# Patient Record
Sex: MALE | Race: BLACK OR AFRICAN AMERICAN | NOT HISPANIC OR LATINO | URBAN - METROPOLITAN AREA
[De-identification: names, ages, dates, MRNs, and addresses within clinical notes are randomized per-mention and may not be internally consistent; named-entity substitution may affect disease eponyms.]

---

## 2017-01-25 ENCOUNTER — EMERGENCY (EMERGENCY)
Facility: HOSPITAL | Age: 57
LOS: 1 days | Discharge: HOME | End: 2017-01-25
Attending: EMERGENCY MEDICINE | Admitting: EMERGENCY MEDICINE
Payer: COMMERCIAL

## 2017-01-25 VITALS
TEMPERATURE: 98 F | RESPIRATION RATE: 19 BRPM | SYSTOLIC BLOOD PRESSURE: 143 MMHG | HEART RATE: 52 BPM | DIASTOLIC BLOOD PRESSURE: 67 MMHG | OXYGEN SATURATION: 100 %

## 2017-01-25 DIAGNOSIS — R07.89 OTHER CHEST PAIN: ICD-10-CM

## 2017-01-25 DIAGNOSIS — I10 ESSENTIAL (PRIMARY) HYPERTENSION: ICD-10-CM

## 2017-01-25 DIAGNOSIS — F17.200 NICOTINE DEPENDENCE, UNSPECIFIED, UNCOMPLICATED: ICD-10-CM

## 2017-01-25 LAB
ALBUMIN SERPL ELPH-MCNC: 4.1 G/DL — SIGNIFICANT CHANGE UP (ref 3.3–5)
ALP SERPL-CCNC: 51 U/L — SIGNIFICANT CHANGE UP (ref 40–120)
ALT FLD-CCNC: 19 U/L RC — SIGNIFICANT CHANGE UP (ref 10–45)
ANION GAP SERPL CALC-SCNC: 12 MMOL/L — SIGNIFICANT CHANGE UP (ref 5–17)
APTT BLD: 25.9 SEC — LOW (ref 27.5–37.4)
AST SERPL-CCNC: 18 U/L — SIGNIFICANT CHANGE UP (ref 10–40)
BASOPHILS # BLD AUTO: 0 K/UL — SIGNIFICANT CHANGE UP (ref 0–0.2)
BASOPHILS NFR BLD AUTO: 0.7 % — SIGNIFICANT CHANGE UP (ref 0–2)
BILIRUB SERPL-MCNC: 0.2 MG/DL — SIGNIFICANT CHANGE UP (ref 0.2–1.2)
BUN SERPL-MCNC: 15 MG/DL — SIGNIFICANT CHANGE UP (ref 7–23)
CALCIUM SERPL-MCNC: 9.4 MG/DL — SIGNIFICANT CHANGE UP (ref 8.4–10.5)
CHLORIDE SERPL-SCNC: 103 MMOL/L — SIGNIFICANT CHANGE UP (ref 96–108)
CK MB BLD-MCNC: 1.5 % — SIGNIFICANT CHANGE UP (ref 0–3.5)
CK MB CFR SERPL CALC: 3.4 NG/ML — SIGNIFICANT CHANGE UP (ref 0–6.7)
CK SERPL-CCNC: 223 U/L — HIGH (ref 30–200)
CO2 SERPL-SCNC: 25 MMOL/L — SIGNIFICANT CHANGE UP (ref 22–31)
CREAT SERPL-MCNC: 1.38 MG/DL — HIGH (ref 0.5–1.3)
EOSINOPHIL # BLD AUTO: 0.2 K/UL — SIGNIFICANT CHANGE UP (ref 0–0.5)
EOSINOPHIL NFR BLD AUTO: 2.8 % — SIGNIFICANT CHANGE UP (ref 0–6)
GLUCOSE SERPL-MCNC: 94 MG/DL — SIGNIFICANT CHANGE UP (ref 70–99)
HCT VFR BLD CALC: 39.4 % — SIGNIFICANT CHANGE UP (ref 39–50)
HGB BLD-MCNC: 13.4 G/DL — SIGNIFICANT CHANGE UP (ref 13–17)
INR BLD: 1.04 RATIO — SIGNIFICANT CHANGE UP (ref 0.88–1.16)
LYMPHOCYTES # BLD AUTO: 2.3 K/UL — SIGNIFICANT CHANGE UP (ref 1–3.3)
LYMPHOCYTES # BLD AUTO: 42.1 % — SIGNIFICANT CHANGE UP (ref 13–44)
MCHC RBC-ENTMCNC: 29.1 PG — SIGNIFICANT CHANGE UP (ref 27–34)
MCHC RBC-ENTMCNC: 34.1 GM/DL — SIGNIFICANT CHANGE UP (ref 32–36)
MCV RBC AUTO: 85.3 FL — SIGNIFICANT CHANGE UP (ref 80–100)
MONOCYTES # BLD AUTO: 0.4 K/UL — SIGNIFICANT CHANGE UP (ref 0–0.9)
MONOCYTES NFR BLD AUTO: 7.4 % — SIGNIFICANT CHANGE UP (ref 2–14)
NEUTROPHILS # BLD AUTO: 2.6 K/UL — SIGNIFICANT CHANGE UP (ref 1.8–7.4)
NEUTROPHILS NFR BLD AUTO: 47 % — SIGNIFICANT CHANGE UP (ref 43–77)
PLATELET # BLD AUTO: 157 K/UL — SIGNIFICANT CHANGE UP (ref 150–400)
POTASSIUM SERPL-MCNC: 4.1 MMOL/L — SIGNIFICANT CHANGE UP (ref 3.5–5.3)
POTASSIUM SERPL-SCNC: 4.1 MMOL/L — SIGNIFICANT CHANGE UP (ref 3.5–5.3)
PROT SERPL-MCNC: 7.3 G/DL — SIGNIFICANT CHANGE UP (ref 6–8.3)
PROTHROM AB SERPL-ACNC: 11.2 SEC — SIGNIFICANT CHANGE UP (ref 10–13.1)
RBC # BLD: 4.62 M/UL — SIGNIFICANT CHANGE UP (ref 4.2–5.8)
RBC # FLD: 13.6 % — SIGNIFICANT CHANGE UP (ref 10.3–14.5)
SODIUM SERPL-SCNC: 140 MMOL/L — SIGNIFICANT CHANGE UP (ref 135–145)
TROPONIN T SERPL-MCNC: <0.01 NG/ML — SIGNIFICANT CHANGE UP (ref 0–0.06)
TROPONIN T SERPL-MCNC: <0.01 NG/ML — SIGNIFICANT CHANGE UP (ref 0–0.06)
WBC # BLD: 5.6 K/UL — SIGNIFICANT CHANGE UP (ref 3.8–10.5)
WBC # FLD AUTO: 5.6 K/UL — SIGNIFICANT CHANGE UP (ref 3.8–10.5)

## 2017-01-25 PROCEDURE — 99220: CPT

## 2017-01-25 PROCEDURE — 71010: CPT | Mod: 26

## 2017-01-25 RX ORDER — ASPIRIN/CALCIUM CARB/MAGNESIUM 324 MG
81 TABLET ORAL DAILY
Qty: 0 | Refills: 0 | Status: DISCONTINUED | OUTPATIENT
Start: 2017-01-25 | End: 2017-01-29

## 2017-01-25 RX ORDER — ASPIRIN/CALCIUM CARB/MAGNESIUM 324 MG
324 TABLET ORAL ONCE
Qty: 0 | Refills: 0 | Status: COMPLETED | OUTPATIENT
Start: 2017-01-25 | End: 2017-01-25

## 2017-01-25 RX ORDER — KETOROLAC TROMETHAMINE 30 MG/ML
30 SYRINGE (ML) INJECTION EVERY 6 HOURS
Qty: 0 | Refills: 0 | Status: DISCONTINUED | OUTPATIENT
Start: 2017-01-25 | End: 2017-01-25

## 2017-01-25 RX ADMIN — Medication 30 MILLIGRAM(S): at 22:04

## 2017-01-25 RX ADMIN — Medication 30 MILLIGRAM(S): at 21:09

## 2017-01-25 RX ADMIN — Medication 324 MILLIGRAM(S): at 14:42

## 2017-01-25 NOTE — ED ADULT NURSE NOTE - OBJECTIVE STATEMENT
57 y/o M, reported to ED from PMD. A&Ox3, c/o shooting pain in his left pectoral area. Pt reports that the pain is moving from the left side of his chest to the right side. Pt states that the pain started last night at 23:00. Pt reports that the pain is intermittent and lasts for a few seconds at a time. Pt reports that he use to have left pectoral muscle pain in the past but he believes that this pain is different. Pt denies LOC, SOB, N/V/D, abd pain. Pt denies fever or chills and is afebrile upon arrival to ED. Wife at bedside. Will continue to monitor pt.

## 2017-01-25 NOTE — ED CDU PROVIDER NOTE - PLAN OF CARE
1. Follow up with your PMD and/or cardiologist within 48-72 hours.   2. Show copies of your reports given to you. Recommend Aspirin 81mg over the counter daily until further evaluation.  Take all of your other medications as previously prescribed.   3. Worsening or continued chest pain, shortness of breath, weakness, return to ED. 1. Stay hydrated.  2. Continue Current Home Medications  3. Follow up with your PCP Dr. Branch in 1-2 days (Bring printed results to your doctor visit).  4. Return if symptoms, worsen, fever, weakness, chest pain, difficulty breathing, dizziness and all other concerns. 1. Stay hydrated. Stop smoking.   2. Continue Current Home Medications  3. Follow up with your PCP Dr. Branch in 1-2 days and follow up elevated creatinine level. (Bring printed results to your doctor visit).  4. Return if symptoms, worsen, fever, weakness, chest pain, difficulty breathing, dizziness and all other concerns. 1. Stay hydrated. Stop smoking.   2. Continue Current Home Medications  3. Follow up with your PCP Dr. Branch in 1-2 days and follow up elevated creatinine level and hgb A1C (level puts you in pre-diabetic category). (Bring printed results to your doctor visit).  4. Return if symptoms, worsen, fever, weakness, chest pain, difficulty breathing, dizziness and all other concerns. 1. Stay hydrated. Stop smoking.   2. Continue Current Home Medications. Take Aspirin 81mg daily.  3. Follow up with your PCP Dr. Bracnh in 1-2 days and follow up elevated creatinine level and hgb A1C level. (Bring printed results to your doctor visit).  4. Return if symptoms, worsen, or  if you develop any fever, weakness, chest pain, difficulty breathing, dizziness or any other concerns.

## 2017-01-25 NOTE — ED CDU PROVIDER NOTE - ATTENDING CONTRIBUTION TO CARE
Attending MD Rizo:   I personally have seen and examined this patient.  Physician assistant note reviewed and agree on plan of care and except where noted.  See HPI, PE, and MDM for details.

## 2017-01-25 NOTE — ED PROVIDER NOTE - MEDICAL DECISION MAKING DETAILS
Attending MD Rizo: 56M with HTN and daily smoker presenting with intermittent chest pain, atypical in nature for ACS however given significant smoking history and HTN will obtain Maico x 2 to r/o ACS and obtain nuclear stress. Do not suspect PE or dissection

## 2017-01-25 NOTE — ED PROVIDER NOTE - ATTENDING CONTRIBUTION TO CARE
Attending MD Rizo:  I personally have seen and examined this patient.  Resident note reviewed and agree on plan of care and except where noted.  See HPI, PE, and MDM for details.

## 2017-01-25 NOTE — ED CDU PROVIDER NOTE - DETAILS
CHEST PAIN  -[facility]  -[facility]  -[facility] EVAL  -Stress test   -CASE D/W ATTENDING Sallie

## 2017-01-25 NOTE — ED ADULT NURSE NOTE - ED STAT RN HANDOFF DETAILS
Report given to SULTANA Brunner   RN was on break at the time. Myesha WEINBERG said Nicole CONTRERAS should send the pt over.

## 2017-01-25 NOTE — ED PROVIDER NOTE - PHYSICAL EXAMINATION
Attending MD Rizo: A & O x 3, NAD, HEENT WNL and no facial asymmetry; lungs CTAB, heart with reg rhythm without murmur; abdomen soft NTND; extremities with no edema; neuro exam non focal with no motor or sensory deficits. peripheral pulses full and equal in bilateral upper and lower extremities

## 2017-01-25 NOTE — ED CDU PROVIDER NOTE - OBJECTIVE STATEMENT
56M with HTN, daily smoker presenting with left anterior chest pain x 1 day, intermittent, mild sharp, nonradiating. No clear modifying factors. Denies dyspnea or diaphoresis. No exertional component. Has not taken anything for pain yet

## 2017-01-25 NOTE — ED PROVIDER NOTE - OBJECTIVE STATEMENT
56M with HTN, daily smoker presenting with left anterior chest pain x 1 day, intermittent, mild sharp, nonradiating. No clear modifying factors. Denies dyspnea or diaphoresis. No exertional component 56M with HTN, daily smoker presenting with left anterior chest pain x 1 day, intermittent, mild sharp, nonradiating. No clear modifying factors. Denies dyspnea or diaphoresis. No exertional component. Has not taken anything for pain yet

## 2017-01-25 NOTE — ED CDU PROVIDER NOTE - PROGRESS NOTE DETAILS
CDU NOTE SULTANA Calvert: pt resting comfortably, feels well except has intermittent chest pain. NAD VSS. no events on tele. chest wall ttp at L anterior chest. anti-inflammatory ordered. CDU NOTE SULTANA Calvert: pt asleep. NAD VSS. no events on tele. CDU NOTE SULTANA Calvert: pt resting comfortably, feels well without complaint. pt reports Toradol helped improve symptoms- less frequent episodes and less intense but did feel 3 episodes overnight. no current cp. NAD VSS. no events on tele. Pt comfortable. Pt states pain is intermittent. Vital signs stable. Will continue to observe and reassess. Awaiting stress test. -Mallory Scanlon PA-C Pt currently in stress lab. -Mallory Scanlon PA-C Pt comfortable. No complaints. Vital signs stable. Stress test shows some fixed defects and artifact. Will d/c pt home on baby aspirin and outpt cardiology follow up. D/W Dr. Sidhu. -Mallory Scanlon PA-C ED attending Dr Cas Sidhu note:  Patient re-evaluated and doing well.  No acute issues at  this time.  Lab and radiology tests reviewed with patient and/or family.  Patient stable for discharge.  I have personally performed a face to face diagnostic evaluation on this patient.  I have reviewed the ACP note and agree with the history, exam, and plan of care, except as noted.  History and Exam by me showed normal exam with no changes. Pt comfortable. No complaints. Vital signs stable. Stress test shows some fixed defects and artifact. Will d/c pt home with follow up. ED attending Dr Cas Robins note:  Patient re-evaluated and doing well.  No acute issues at  this time.  Lab and radiology tests reviewed with patient and/or family.  Patient stable for discharge.  I have personally performed a face to face diagnostic evaluation on this patient.  I have reviewed the ACP note and agree with the history, exam, and plan of care, except as noted.  History and Exam by me showed normal exam with no changes. Pt comfortable. No complaints. Vital signs stable. Stress test shows some fixed defects and artifact. Will d/c pt home with follow up.  cas robins md

## 2017-01-26 VITALS
DIASTOLIC BLOOD PRESSURE: 92 MMHG | HEART RATE: 56 BPM | SYSTOLIC BLOOD PRESSURE: 143 MMHG | RESPIRATION RATE: 18 BRPM | OXYGEN SATURATION: 98 % | TEMPERATURE: 98 F

## 2017-01-26 PROCEDURE — 93018 CV STRESS TEST I&R ONLY: CPT

## 2017-01-26 PROCEDURE — 78452 HT MUSCLE IMAGE SPECT MULT: CPT | Mod: 26

## 2017-01-26 PROCEDURE — 71045 X-RAY EXAM CHEST 1 VIEW: CPT

## 2017-01-26 PROCEDURE — 99284 EMERGENCY DEPT VISIT MOD MDM: CPT | Mod: 25

## 2017-01-26 PROCEDURE — G0378: CPT

## 2017-01-26 PROCEDURE — 93017 CV STRESS TEST TRACING ONLY: CPT

## 2017-01-26 PROCEDURE — 96374 THER/PROPH/DIAG INJ IV PUSH: CPT

## 2017-01-26 PROCEDURE — 93016 CV STRESS TEST SUPVJ ONLY: CPT

## 2017-01-26 PROCEDURE — 82553 CREATINE MB FRACTION: CPT

## 2017-01-26 PROCEDURE — 93005 ELECTROCARDIOGRAM TRACING: CPT

## 2017-01-26 PROCEDURE — 80048 BASIC METABOLIC PNL TOTAL CA: CPT

## 2017-01-26 PROCEDURE — 80053 COMPREHEN METABOLIC PANEL: CPT

## 2017-01-26 PROCEDURE — 83036 HEMOGLOBIN GLYCOSYLATED A1C: CPT

## 2017-01-26 PROCEDURE — 80061 LIPID PANEL: CPT

## 2017-01-26 PROCEDURE — 84484 ASSAY OF TROPONIN QUANT: CPT

## 2017-01-26 PROCEDURE — 85027 COMPLETE CBC AUTOMATED: CPT

## 2017-01-26 PROCEDURE — 82550 ASSAY OF CK (CPK): CPT

## 2017-01-26 PROCEDURE — 85730 THROMBOPLASTIN TIME PARTIAL: CPT

## 2017-01-26 PROCEDURE — A9500: CPT

## 2017-01-26 PROCEDURE — 78452 HT MUSCLE IMAGE SPECT MULT: CPT

## 2017-01-26 PROCEDURE — 85610 PROTHROMBIN TIME: CPT

## 2017-01-26 RX ORDER — SODIUM CHLORIDE 9 MG/ML
1000 INJECTION INTRAMUSCULAR; INTRAVENOUS; SUBCUTANEOUS ONCE
Qty: 0 | Refills: 0 | Status: COMPLETED | OUTPATIENT
Start: 2017-01-26 | End: 2017-01-26

## 2017-01-26 RX ADMIN — Medication 81 MILLIGRAM(S): at 13:32

## 2017-01-26 RX ADMIN — SODIUM CHLORIDE 1000 MILLILITER(S): 9 INJECTION INTRAMUSCULAR; INTRAVENOUS; SUBCUTANEOUS at 06:38

## 2017-01-26 NOTE — ED ADULT NURSE REASSESSMENT NOTE - NS ED NURSE REASSESS COMMENT FT1
Pt. discharged to home as per Dr. Huerta's order. Pt. verbalized understanding to discharge instructions given by SULTANA Tejada. Pt. to follow up with PCP post discharge. Pt. to return to ER for worsening symptoms such as fever, CP and SOB. Questions answered and verbalized good understanding. Discharge criteria met. Discharged home as per protocol. Telemetry discontinued and IVL removed. No redness or bleeding from IV site. Pt. left the unit in stable condition.
pt arrived and oriented to CDU.  Pt A&OX3.  VSS.  pt states that he has been having intermediate sharp left sided CP for the past few weeks.  pt denies any SOB/dizziness/palps.  He also states that his left arm has a "different sensation" but denies numbness and tingling.  pt reports that towards the end of December he was dx with hypertension and was having blurred vision and has since been on a medication regime.  pt currently denies CP.  pt sinus power in 50's on cardiac monitor.  pt pending stress test in am.  CE #2 due at 2000.  plan of care explained to pt and family.  Safety maintained.  meal provided. Will cont to monitor.
report taken from Elis CONTRERAS pt stable no complaints.
Received pt. from ROSI Storey. Sinus power on tele monitor. Pt. still with c/o chest discomfort that he came in to ER with. Refuses any medications at this time. Awaiting nuclear stress. IV access patent. Educated about fall precautions. Safety maintained. Needs attended to. Will continue to monitor.

## 2017-01-30 PROCEDURE — 99217: CPT

## 2017-04-04 PROBLEM — Z00.00 ENCOUNTER FOR PREVENTIVE HEALTH EXAMINATION: Status: ACTIVE | Noted: 2017-04-04

## 2017-04-06 ENCOUNTER — LABORATORY RESULT (OUTPATIENT)
Age: 57
End: 2017-04-06

## 2017-04-06 ENCOUNTER — APPOINTMENT (OUTPATIENT)
Dept: RHEUMATOLOGY | Facility: CLINIC | Age: 57
End: 2017-04-06

## 2017-04-06 VITALS
DIASTOLIC BLOOD PRESSURE: 78 MMHG | RESPIRATION RATE: 16 BRPM | TEMPERATURE: 98 F | HEIGHT: 72 IN | WEIGHT: 231 LBS | BODY MASS INDEX: 31.29 KG/M2 | SYSTOLIC BLOOD PRESSURE: 140 MMHG | OXYGEN SATURATION: 98 % | HEART RATE: 48 BPM

## 2017-04-06 DIAGNOSIS — F17.200 NICOTINE DEPENDENCE, UNSPECIFIED, UNCOMPLICATED: ICD-10-CM

## 2017-04-06 LAB
ALBUMIN SERPL ELPH-MCNC: 4.1 G/DL
ALP BLD-CCNC: 51 U/L
ALT SERPL-CCNC: 16 U/L
ANION GAP SERPL CALC-SCNC: 15 MMOL/L
APPEARANCE: CLEAR
AST SERPL-CCNC: 16 U/L
BASOPHILS # BLD AUTO: 0.05 K/UL
BASOPHILS NFR BLD AUTO: 0.7 %
BILIRUB SERPL-MCNC: 0.2 MG/DL
BILIRUBIN URINE: NEGATIVE
BLOOD URINE: ABNORMAL
BUN SERPL-MCNC: 11 MG/DL
CALCIUM SERPL-MCNC: 9.5 MG/DL
CHLORIDE SERPL-SCNC: 104 MMOL/L
CO2 SERPL-SCNC: 26 MMOL/L
COLOR: YELLOW
CREAT SERPL-MCNC: 1.47 MG/DL
CRP SERPL-MCNC: 0.4 MG/DL
EOSINOPHIL # BLD AUTO: 0.22 K/UL
EOSINOPHIL NFR BLD AUTO: 3.1 %
GLUCOSE QUALITATIVE U: NORMAL MG/DL
GLUCOSE SERPL-MCNC: 96 MG/DL
HCT VFR BLD CALC: 43.8 %
HGB BLD-MCNC: 14.1 G/DL
IMM GRANULOCYTES NFR BLD AUTO: 0 %
KETONES URINE: NEGATIVE
LEUKOCYTE ESTERASE URINE: NEGATIVE
LYMPHOCYTES # BLD AUTO: 2.71 K/UL
LYMPHOCYTES NFR BLD AUTO: 38.2 %
MAN DIFF?: NORMAL
MCHC RBC-ENTMCNC: 27.6 PG
MCHC RBC-ENTMCNC: 32.2 GM/DL
MCV RBC AUTO: 85.7 FL
MONOCYTES # BLD AUTO: 0.45 K/UL
MONOCYTES NFR BLD AUTO: 6.3 %
NEUTROPHILS # BLD AUTO: 3.67 K/UL
NEUTROPHILS NFR BLD AUTO: 51.7 %
NITRITE URINE: NEGATIVE
PH URINE: 5.5
PLATELET # BLD AUTO: 186 K/UL
POTASSIUM SERPL-SCNC: 4.6 MMOL/L
PROT SERPL-MCNC: 6.9 G/DL
PROTEIN URINE: NEGATIVE MG/DL
RBC # BLD: 5.11 M/UL
RBC # FLD: 15.9 %
RHEUMATOID FACT SER QL: 8.2 IU/ML
SODIUM SERPL-SCNC: 145 MMOL/L
SPECIFIC GRAVITY URINE: 1.02
URATE SERPL-MCNC: 6 MG/DL
UROBILINOGEN URINE: NORMAL MG/DL
WBC # FLD AUTO: 7.1 K/UL

## 2017-04-06 RX ORDER — ERGOCALCIFEROL 1.25 MG/1
1.25 MG CAPSULE, LIQUID FILLED ORAL
Qty: 4 | Refills: 0 | Status: ACTIVE | COMMUNITY
Start: 2016-11-18

## 2017-04-07 LAB
CCP AB SER IA-ACNC: <8 UNITS
ERYTHROCYTE [SEDIMENTATION RATE] IN BLOOD BY WESTERGREN METHOD: 17 MM/HR
RF+CCP IGG SER-IMP: NEGATIVE

## 2017-05-15 ENCOUNTER — APPOINTMENT (OUTPATIENT)
Dept: RHEUMATOLOGY | Facility: CLINIC | Age: 57
End: 2017-05-15

## 2017-05-15 VITALS
HEART RATE: 52 BPM | DIASTOLIC BLOOD PRESSURE: 73 MMHG | OXYGEN SATURATION: 98 % | TEMPERATURE: 98 F | SYSTOLIC BLOOD PRESSURE: 134 MMHG | RESPIRATION RATE: 16 BRPM

## 2017-05-15 DIAGNOSIS — I49.9 CARDIAC ARRHYTHMIA, UNSPECIFIED: ICD-10-CM

## 2017-05-17 PROBLEM — I49.9 IRREGULAR HEART RHYTHM: Status: ACTIVE | Noted: 2017-04-12

## 2017-07-25 ENCOUNTER — APPOINTMENT (OUTPATIENT)
Dept: RHEUMATOLOGY | Facility: CLINIC | Age: 57
End: 2017-07-25

## 2017-07-25 VITALS
TEMPERATURE: 97.7 F | SYSTOLIC BLOOD PRESSURE: 169 MMHG | HEART RATE: 61 BPM | BODY MASS INDEX: 31.15 KG/M2 | HEIGHT: 72 IN | OXYGEN SATURATION: 97 % | WEIGHT: 230 LBS | DIASTOLIC BLOOD PRESSURE: 81 MMHG

## 2017-07-25 RX ORDER — AMLODIPINE BESYLATE AND BENAZEPRIL HYDROCHLORIDE 5; 20 MG/1; MG/1
5-20 CAPSULE ORAL
Qty: 30 | Refills: 0 | Status: DISCONTINUED | COMMUNITY
Start: 2017-02-01 | End: 2017-07-25

## 2017-07-25 RX ORDER — METOPROLOL SUCCINATE 50 MG/1
50 TABLET, EXTENDED RELEASE ORAL
Qty: 30 | Refills: 0 | Status: ACTIVE | COMMUNITY
Start: 2017-07-12

## 2018-01-23 ENCOUNTER — FORM ENCOUNTER (OUTPATIENT)
Age: 58
End: 2018-01-23

## 2018-01-24 ENCOUNTER — APPOINTMENT (OUTPATIENT)
Dept: RHEUMATOLOGY | Facility: CLINIC | Age: 58
End: 2018-01-24
Payer: COMMERCIAL

## 2018-01-24 PROCEDURE — 73030 X-RAY EXAM OF SHOULDER: CPT | Mod: RT

## 2018-02-22 ENCOUNTER — APPOINTMENT (OUTPATIENT)
Dept: RHEUMATOLOGY | Facility: CLINIC | Age: 58
End: 2018-02-22
Payer: COMMERCIAL

## 2018-02-22 VITALS
OXYGEN SATURATION: 98 % | BODY MASS INDEX: 32.23 KG/M2 | WEIGHT: 238 LBS | DIASTOLIC BLOOD PRESSURE: 87 MMHG | HEIGHT: 72 IN | TEMPERATURE: 97.5 F | SYSTOLIC BLOOD PRESSURE: 140 MMHG

## 2018-02-22 PROCEDURE — 99213 OFFICE O/P EST LOW 20 MIN: CPT

## 2018-03-22 ENCOUNTER — RX RENEWAL (OUTPATIENT)
Age: 58
End: 2018-03-22

## 2018-03-22 ENCOUNTER — MOBILE ON CALL (OUTPATIENT)
Age: 58
End: 2018-03-22

## 2018-03-22 RX ORDER — PREDNISONE 20 MG/1
20 TABLET ORAL
Qty: 5 | Refills: 0 | Status: DISCONTINUED | COMMUNITY
Start: 2018-03-22 | End: 2018-03-22

## 2018-03-26 ENCOUNTER — RX RENEWAL (OUTPATIENT)
Age: 58
End: 2018-03-26

## 2018-03-27 RX ORDER — DICLOFENAC SODIUM 10 MG/G
1 GEL TOPICAL
Qty: 1 | Refills: 3 | Status: ACTIVE | COMMUNITY
Start: 2017-04-06

## 2018-04-05 ENCOUNTER — APPOINTMENT (OUTPATIENT)
Dept: RHEUMATOLOGY | Facility: CLINIC | Age: 58
End: 2018-04-05
Payer: COMMERCIAL

## 2018-04-05 VITALS
HEART RATE: 50 BPM | SYSTOLIC BLOOD PRESSURE: 124 MMHG | HEIGHT: 72 IN | WEIGHT: 233 LBS | OXYGEN SATURATION: 98 % | DIASTOLIC BLOOD PRESSURE: 75 MMHG | TEMPERATURE: 97.6 F | BODY MASS INDEX: 31.56 KG/M2

## 2018-04-05 PROCEDURE — 20610 DRAIN/INJ JOINT/BURSA W/O US: CPT | Mod: RT

## 2018-04-05 RX ORDER — DILTIAZEM HYDROCHLORIDE 180 MG/1
180 CAPSULE, EXTENDED RELEASE ORAL
Qty: 30 | Refills: 0 | Status: ACTIVE | COMMUNITY
Start: 2017-08-04

## 2018-04-05 RX ORDER — PANTOPRAZOLE 40 MG/1
40 TABLET, DELAYED RELEASE ORAL
Qty: 30 | Refills: 0 | Status: ACTIVE | COMMUNITY
Start: 2018-01-10

## 2018-04-06 ENCOUNTER — MED ADMIN CHARGE (OUTPATIENT)
Age: 58
End: 2018-04-06

## 2018-04-06 RX ORDER — LIDOCAINE HYDROCHLORIDE 10 MG/ML
1 INJECTION, SOLUTION INFILTRATION; PERINEURAL
Refills: 0 | Status: COMPLETED | OUTPATIENT
Start: 2018-04-06

## 2018-04-06 RX ORDER — METHYLPRED ACET/NACL,ISO-OS/PF 40 MG/ML
40 VIAL (ML) INJECTION
Qty: 1 | Refills: 0 | Status: COMPLETED | OUTPATIENT
Start: 2018-04-06

## 2018-04-06 RX ADMIN — LIDOCAINE HYDROCHLORIDE %: 10 INJECTION, SOLUTION INFILTRATION; PERINEURAL at 00:00

## 2018-04-06 RX ADMIN — METHYLPREDNISOLONE ACETATE MG/ML: 40 INJECTION, SUSPENSION INTRA-ARTICULAR; INTRALESIONAL; INTRAMUSCULAR; SOFT TISSUE at 00:00

## 2018-04-16 ENCOUNTER — CHART COPY (OUTPATIENT)
Age: 58
End: 2018-04-16

## 2018-07-20 ENCOUNTER — APPOINTMENT (OUTPATIENT)
Dept: RHEUMATOLOGY | Facility: CLINIC | Age: 58
End: 2018-07-20
Payer: COMMERCIAL

## 2018-07-20 VITALS
BODY MASS INDEX: 30.34 KG/M2 | HEIGHT: 72 IN | DIASTOLIC BLOOD PRESSURE: 86 MMHG | WEIGHT: 224 LBS | SYSTOLIC BLOOD PRESSURE: 140 MMHG | TEMPERATURE: 98 F | HEART RATE: 60 BPM | OXYGEN SATURATION: 98 %

## 2018-07-20 LAB
BASOPHILS # BLD AUTO: 0.04 K/UL
BASOPHILS NFR BLD AUTO: 0.6 %
EOSINOPHIL # BLD AUTO: 0.08 K/UL
EOSINOPHIL NFR BLD AUTO: 1.1 %
ERYTHROCYTE [SEDIMENTATION RATE] IN BLOOD BY WESTERGREN METHOD: 18 MM/HR
HCT VFR BLD CALC: 45.2 %
HGB BLD-MCNC: 14.4 G/DL
IMM GRANULOCYTES NFR BLD AUTO: 0.3 %
LYMPHOCYTES # BLD AUTO: 2.19 K/UL
LYMPHOCYTES NFR BLD AUTO: 30.1 %
MAN DIFF?: NORMAL
MCHC RBC-ENTMCNC: 27.7 PG
MCHC RBC-ENTMCNC: 31.9 GM/DL
MCV RBC AUTO: 86.9 FL
MONOCYTES # BLD AUTO: 0.47 K/UL
MONOCYTES NFR BLD AUTO: 6.5 %
NEUTROPHILS # BLD AUTO: 4.47 K/UL
NEUTROPHILS NFR BLD AUTO: 61.4 %
PLATELET # BLD AUTO: 200 K/UL
RBC # BLD: 5.2 M/UL
RBC # FLD: 15.8 %
WBC # FLD AUTO: 7.27 K/UL

## 2018-07-20 PROCEDURE — 99213 OFFICE O/P EST LOW 20 MIN: CPT

## 2018-07-20 RX ORDER — CYCLOBENZAPRINE HYDROCHLORIDE 10 MG/1
10 TABLET, FILM COATED ORAL
Qty: 40 | Refills: 0 | Status: DISCONTINUED | COMMUNITY
Start: 2018-01-10 | End: 2018-07-20

## 2018-07-24 LAB
25(OH)D3 SERPL-MCNC: 21.5 NG/ML
ALBUMIN SERPL ELPH-MCNC: 4.5 G/DL
ALP BLD-CCNC: 57 U/L
ALT SERPL-CCNC: 18 U/L
ANION GAP SERPL CALC-SCNC: 12 MMOL/L
APPEARANCE: CLEAR
AST SERPL-CCNC: 18 U/L
BILIRUB SERPL-MCNC: 0.3 MG/DL
BILIRUBIN URINE: NEGATIVE
BLOOD URINE: NEGATIVE
BUN SERPL-MCNC: 10 MG/DL
CALCIUM SERPL-MCNC: 9.7 MG/DL
CCP AB SER IA-ACNC: <8 UNITS
CHLORIDE SERPL-SCNC: 103 MMOL/L
CO2 SERPL-SCNC: 30 MMOL/L
COLOR: YELLOW
CREAT SERPL-MCNC: 1.43 MG/DL
CRP SERPL-MCNC: 0.52 MG/DL
GLUCOSE QUALITATIVE U: NEGATIVE MG/DL
GLUCOSE SERPL-MCNC: 111 MG/DL
KETONES URINE: NEGATIVE
LEUKOCYTE ESTERASE URINE: NEGATIVE
NITRITE URINE: NEGATIVE
PH URINE: 6.5
POTASSIUM SERPL-SCNC: 4.7 MMOL/L
PROT SERPL-MCNC: 7.5 G/DL
PROTEIN URINE: NEGATIVE MG/DL
RF+CCP IGG SER-IMP: NEGATIVE
RHEUMATOID FACT SER QL: <10 IU/ML
SODIUM SERPL-SCNC: 144 MMOL/L
SPECIFIC GRAVITY URINE: 1.01
UROBILINOGEN URINE: NEGATIVE MG/DL

## 2018-07-25 ENCOUNTER — FORM ENCOUNTER (OUTPATIENT)
Age: 58
End: 2018-07-25

## 2018-07-26 ENCOUNTER — OUTPATIENT (OUTPATIENT)
Dept: OUTPATIENT SERVICES | Facility: HOSPITAL | Age: 58
LOS: 1 days | End: 2018-07-26
Payer: COMMERCIAL

## 2018-07-26 ENCOUNTER — APPOINTMENT (OUTPATIENT)
Dept: RADIOLOGY | Facility: IMAGING CENTER | Age: 58
End: 2018-07-26
Payer: COMMERCIAL

## 2018-07-26 DIAGNOSIS — M54.9 DORSALGIA, UNSPECIFIED: ICD-10-CM

## 2018-07-26 PROCEDURE — 72100 X-RAY EXAM L-S SPINE 2/3 VWS: CPT | Mod: 26

## 2018-07-26 PROCEDURE — 72100 X-RAY EXAM L-S SPINE 2/3 VWS: CPT

## 2018-11-16 ENCOUNTER — APPOINTMENT (OUTPATIENT)
Dept: RHEUMATOLOGY | Facility: CLINIC | Age: 58
End: 2018-11-16
Payer: COMMERCIAL

## 2018-11-16 ENCOUNTER — LABORATORY RESULT (OUTPATIENT)
Age: 58
End: 2018-11-16

## 2018-11-16 VITALS
SYSTOLIC BLOOD PRESSURE: 136 MMHG | WEIGHT: 234 LBS | HEIGHT: 72 IN | TEMPERATURE: 98.1 F | BODY MASS INDEX: 31.69 KG/M2 | OXYGEN SATURATION: 98 % | DIASTOLIC BLOOD PRESSURE: 80 MMHG | HEART RATE: 64 BPM

## 2018-11-16 DIAGNOSIS — R35.0 FREQUENCY OF MICTURITION: ICD-10-CM

## 2018-11-16 DIAGNOSIS — M13.0 POLYARTHRITIS, UNSPECIFIED: ICD-10-CM

## 2018-11-16 LAB
APPEARANCE: CLEAR
BILIRUBIN URINE: NEGATIVE
BLOOD URINE: ABNORMAL
COLOR: YELLOW
GLUCOSE QUALITATIVE U: NEGATIVE MG/DL
KETONES URINE: NEGATIVE
LEUKOCYTE ESTERASE URINE: NEGATIVE
NITRITE URINE: NEGATIVE
PH URINE: 5.5
PROTEIN URINE: NEGATIVE MG/DL
SPECIFIC GRAVITY URINE: 1.03
UROBILINOGEN URINE: NEGATIVE MG/DL

## 2018-11-16 PROCEDURE — 99213 OFFICE O/P EST LOW 20 MIN: CPT

## 2018-11-16 RX ORDER — METHOCARBAMOL 500 MG/1
500 TABLET, FILM COATED ORAL 3 TIMES DAILY
Qty: 60 | Refills: 0 | Status: DISCONTINUED | COMMUNITY
Start: 2018-07-20 | End: 2018-11-16

## 2018-11-16 RX ORDER — DILTIAZEM HYDROCHLORIDE 240 MG/1
240 TABLET, EXTENDED RELEASE ORAL
Qty: 30 | Refills: 0 | Status: DISCONTINUED | COMMUNITY
Start: 2018-02-13 | End: 2018-11-16

## 2018-11-16 RX ORDER — BUDESONIDE AND FORMOTEROL FUMARATE DIHYDRATE 80; 4.5 UG/1; UG/1
80-4.5 AEROSOL RESPIRATORY (INHALATION)
Qty: 10 | Refills: 0 | Status: DISCONTINUED | COMMUNITY
Start: 2016-11-18 | End: 2018-11-16

## 2018-11-19 ENCOUNTER — RX RENEWAL (OUTPATIENT)
Age: 58
End: 2018-11-19

## 2018-11-19 LAB
ANION GAP SERPL CALC-SCNC: 11 MMOL/L
BUN SERPL-MCNC: 16 MG/DL
CALCIUM SERPL-MCNC: 9.5 MG/DL
CHLORIDE SERPL-SCNC: 104 MMOL/L
CO2 SERPL-SCNC: 28 MMOL/L
CREAT SERPL-MCNC: 1.48 MG/DL
GLUCOSE SERPL-MCNC: 88 MG/DL
POTASSIUM SERPL-SCNC: 3.9 MMOL/L
SODIUM SERPL-SCNC: 143 MMOL/L

## 2018-11-20 PROBLEM — M13.0 POLYARTHRITIS: Status: ACTIVE | Noted: 2017-04-06

## 2019-03-25 ENCOUNTER — INBOUND DOCUMENT (OUTPATIENT)
Age: 59
End: 2019-03-25

## 2019-10-24 ENCOUNTER — CHART COPY (OUTPATIENT)
Age: 59
End: 2019-10-24

## 2019-11-01 ENCOUNTER — CHART COPY (OUTPATIENT)
Age: 59
End: 2019-11-01

## 2019-11-05 ENCOUNTER — RX RENEWAL (OUTPATIENT)
Age: 59
End: 2019-11-05

## 2019-11-08 ENCOUNTER — RX RENEWAL (OUTPATIENT)
Age: 59
End: 2019-11-08

## 2020-01-21 DIAGNOSIS — K75.3 GRANULOMATOUS HEPATITIS, NOT ELSEWHERE CLASSIFIED: ICD-10-CM

## 2020-01-22 LAB
CRP SERPL-MCNC: 0.48 MG/DL
ERYTHROCYTE [SEDIMENTATION RATE] IN BLOOD BY WESTERGREN METHOD: 43 MM/HR

## 2020-01-23 LAB
ACE BLD-CCNC: <5 U/L
M TB IFN-G BLD-IMP: NEGATIVE
QUANTIFERON TB PLUS MITOGEN MINUS NIL: 9.62 IU/ML
QUANTIFERON TB PLUS NIL: 0.03 IU/ML
QUANTIFERON TB PLUS TB1 MINUS NIL: -0.01 IU/ML
QUANTIFERON TB PLUS TB2 MINUS NIL: -0.01 IU/ML

## 2020-02-27 ENCOUNTER — APPOINTMENT (OUTPATIENT)
Dept: ORTHOPEDIC SURGERY | Facility: CLINIC | Age: 60
End: 2020-02-27

## 2020-06-16 ENCOUNTER — APPOINTMENT (OUTPATIENT)
Dept: RHEUMATOLOGY | Facility: CLINIC | Age: 60
End: 2020-06-16
Payer: COMMERCIAL

## 2020-06-16 VITALS
RESPIRATION RATE: 16 BRPM | OXYGEN SATURATION: 98 % | WEIGHT: 226 LBS | SYSTOLIC BLOOD PRESSURE: 164 MMHG | TEMPERATURE: 97.5 F | HEART RATE: 68 BPM | DIASTOLIC BLOOD PRESSURE: 97 MMHG | BODY MASS INDEX: 30.61 KG/M2 | HEIGHT: 72 IN

## 2020-06-16 PROCEDURE — 99214 OFFICE O/P EST MOD 30 MIN: CPT

## 2020-06-16 RX ORDER — CELECOXIB 100 MG/1
100 CAPSULE ORAL TWICE DAILY
Qty: 60 | Refills: 0 | Status: DISCONTINUED | COMMUNITY
Start: 2018-11-19 | End: 2020-06-16

## 2020-06-16 RX ORDER — METHOCARBAMOL 500 MG/1
500 TABLET, FILM COATED ORAL 3 TIMES DAILY
Qty: 30 | Refills: 2 | Status: ACTIVE | COMMUNITY
Start: 2020-06-16 | End: 1900-01-01

## 2020-06-16 RX ORDER — AMLODIPINE BESYLATE AND BENAZEPRIL HYDROCHLORIDE 10; 20 MG/1; MG/1
10-20 CAPSULE ORAL
Qty: 90 | Refills: 3 | Status: ACTIVE | COMMUNITY
Start: 2020-06-16 | End: 1900-01-01

## 2020-06-22 ENCOUNTER — APPOINTMENT (OUTPATIENT)
Dept: RADIOLOGY | Facility: IMAGING CENTER | Age: 60
End: 2020-06-22
Payer: COMMERCIAL

## 2020-06-22 ENCOUNTER — OUTPATIENT (OUTPATIENT)
Dept: OUTPATIENT SERVICES | Facility: HOSPITAL | Age: 60
LOS: 1 days | End: 2020-06-22
Payer: COMMERCIAL

## 2020-06-22 DIAGNOSIS — M54.2 CERVICALGIA: ICD-10-CM

## 2020-06-22 PROCEDURE — 72040 X-RAY EXAM NECK SPINE 2-3 VW: CPT

## 2020-06-22 PROCEDURE — 72040 X-RAY EXAM NECK SPINE 2-3 VW: CPT | Mod: 26

## 2020-06-23 DIAGNOSIS — R20.2 PARESTHESIA OF SKIN: ICD-10-CM

## 2020-07-30 ENCOUNTER — APPOINTMENT (OUTPATIENT)
Dept: PHYSICAL MEDICINE AND REHAB | Facility: CLINIC | Age: 60
End: 2020-07-30
Payer: COMMERCIAL

## 2020-07-30 VITALS
TEMPERATURE: 97.2 F | SYSTOLIC BLOOD PRESSURE: 126 MMHG | OXYGEN SATURATION: 98 % | HEART RATE: 53 BPM | DIASTOLIC BLOOD PRESSURE: 72 MMHG

## 2020-07-30 DIAGNOSIS — M75.101 UNSPECIFIED ROTATOR CUFF TEAR OR RUPTURE OF RIGHT SHOULDER, NOT SPECIFIED AS TRAUMATIC: ICD-10-CM

## 2020-07-30 PROCEDURE — 99205 OFFICE O/P NEW HI 60 MIN: CPT

## 2020-08-01 NOTE — PHYSICAL EXAM
[FreeTextEntry1] : Gen: NAD\par Neck: ROM limited by pain, tenderness to lower cervical paraspinals and bilateral upper trapezius, neg spurling\par CV: no cyanosis\par Pulm: breathing well on room air\par Abd: soft\par Low back: range of motion limited by pain, tenderness to palpation lower lumbar paraspinals, neg straight leg raise, neg FABERE, neg FAIR\par Right elbow: FAROM, positive tinel's at the elbow, neg mill's, neg cozen's\par Right hand: subtle weakness thumb and finger abduction compared to contralateral side, no appreciable ulnar claw, palmaris brevis sign, wartenberg's sign, neg tinel's, neg phalen's, neg finkelstein, +hyperesthesia ulnar distribution of the hand including dorsal aspect and medial palm\par Msk: \par 5/5 hip flexion B/L, 5/5 knee extension B/L, 5/5 knee flexion B/L, 5/5 dorsiflexion B/L, 5/5 plantar flexion B/L\par 5/5 shoulder abduction B/L, 5/5 elbow flexion B/L, 5/5 elbow extension B/L, 5/5 wrist extension B/L, 5/5 hand  B/L\par Neuro: sensation intact to light touch in bilateral upper and lower extremities, reflexes 2+ brachioradialis, biceps, triceps bilaterally, reflexes 2+ patella, medial hamstring, achilles bilaterally, negative babinski, negative naidu\par

## 2020-08-01 NOTE — REVIEW OF SYSTEMS
[Negative] : Heme/Lymph [FreeTextEntry9] : +neck, low back pain [de-identified] : +Medial hand numbness and paresthesia

## 2020-08-01 NOTE — ASSESSMENT
[FreeTextEntry1] : 59 yo M who presents with \par 1) neck pain consistent with cervicalgia, cervical facet syndrome, and cervical radiculopathy\par 2) numbness left hand consistent with ulnar neuropathy at the elbow vs. ulnar neuropathy at the wrist vs. cervical radiculopathy\par 3) low back pain consistent with lumbar radiculopathy, lumbar spinal stenosis, and lumbar degenerative disc disease.\par \par -EMG/NCS ordered to determine etiology of medial hand pain and paresthesia.\par -Start PT/HEP, new referral provided\par -MRI cervical spine w/o contrast denied by patient's medical insurance.  If pain persists or worsen despite compliance with PT/HEP, then will reorder at follow up visit.\par \par Nish Barone MD\par Spine and Sports Medicine\par \par [employer]\par At [facility]\par \par

## 2020-08-01 NOTE — HISTORY OF PRESENT ILLNESS
[FreeTextEntry1] : 61 yo M with PMH fibromyalgia, HLD, HTN, GERD who presents with neck and low back pain.\par \par Onset: 2-3 months neck pain/right arm paresthesia.  Low back pain present for several years.  No inciting events, trauma, or falls.\par Location: right side cervical spine, lower lumbar spine\par Characteristics: sharp\par Aggravating factors: elbow pressure, overhead activities (right arm paresthesia), prolonged sitting, standing, walking (low back pain)\par Alleviating factors: rest\par Radiation: right upper extremities, right lower extremities\par Treatments: right shoulder injection with significant improvement for close to one year, no PT/HEP, no recent trials of PO medication\par Severity:  7/10\par \par Diagnostic studies:\par MRI lumbar spine disc bulge L4-L5 leading to central spinal stenosis\par No previous MRI cervical spine\par MRI right shoulder w/o contrast showing parital-thickness articular side tear of supraspinatus.\par No previous EMG/NCS\par \par Patient denies new weakness, numbness or paresthesia.  Patient denies bowel/bladder dysfunction, fevers, chills, weight loss, night pain, or night sweats.\par

## 2020-08-06 ENCOUNTER — APPOINTMENT (OUTPATIENT)
Dept: PHYSICAL MEDICINE AND REHAB | Facility: CLINIC | Age: 60
End: 2020-08-06
Payer: COMMERCIAL

## 2020-08-06 VITALS
DIASTOLIC BLOOD PRESSURE: 109 MMHG | SYSTOLIC BLOOD PRESSURE: 171 MMHG | OXYGEN SATURATION: 100 % | HEART RATE: 72 BPM | TEMPERATURE: 97.6 F

## 2020-08-06 DIAGNOSIS — M54.16 RADICULOPATHY, LUMBAR REGION: ICD-10-CM

## 2020-08-06 PROCEDURE — 95911 NRV CNDJ TEST 9-10 STUDIES: CPT

## 2020-08-06 PROCEDURE — 95886 MUSC TEST DONE W/N TEST COMP: CPT

## 2020-08-09 NOTE — PHYSICAL EXAM
[FreeTextEntry1] : Gen: NAD\par Neck: ROM limited by pain, tenderness to lower cervical paraspinals and bilateral upper trapezius, pos spurling right\par CV: no cyanosis\par Pulm: breathing well on room air\par Abd: soft\par Low back: range of motion limited by pain, tenderness to palpation lower lumbar paraspinals, neg straight leg raise, neg FABERE, neg FAIR\par Right elbow: FAROM, positive tinel's at the elbow, neg mill's, neg cozen's\par Right hand: subtle weakness thumb and finger abduction compared to contralateral side, no appreciable ulnar claw, palmaris brevis sign, wartenberg's sign, neg tinel's, neg phalen's, neg finkelstein, +hyperesthesia median distribution of hand which extends into medial forearm. \par Msk: \par 5/5 hip flexion B/L, 5/5 knee extension B/L, 5/5 knee flexion B/L, 5/5 dorsiflexion B/L, 5/5 plantar flexion B/L\par 5/5 shoulder abduction B/L, 5/5 elbow flexion B/L, 5/5 elbow extension B/L, 5/5 wrist extension B/L, 5/5 hand  B/L\par Neuro: sensation intact to light touch in bilateral upper and lower extremities, reflexes 2+ brachioradialis, biceps, triceps bilaterally, reflexes 2+ patella, medial hamstring, achilles bilaterally, negative babinski, negative naidu\par

## 2020-08-09 NOTE — ASSESSMENT
[FreeTextEntry1] : 59 yo M who presents with \par 1) neck pain consistent with cervicalgia, cervical facet syndrome, and cervical radiculopathy\par 2) numbness left hand consistent with ulnar neuropathy at the elbow vs. ulnar neuropathy at the wrist vs. 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\par #electrodiagnostic evidence of  a subacute right C5 and C6 radiculopathies\par #Full EMG/NCS report to follow\par -Start PT/HEP, referral provided on last visit and patient scheduled to start tomorrow\par -Start neutral wrist splint and elbow compression for CTS and UNE, respectively\par -RTC 2-3 weeks, If pain persists or worsen despite compliance with above, then will consider reordering MRI cervical spine w/o contrast at next visit.\par \par Nish Barone MD\par Spine and Sports Medicine\par \par [employer]\par At [facility]\par \par

## 2020-08-09 NOTE — HISTORY OF PRESENT ILLNESS
[FreeTextEntry1] : 8/6/20\par 61 yo M who presents for follow up with neck and low back pain.  Patient reports that pain has migrated to the lateral aspect of his right hand and medial forearm.  Patient continues to have some pain in the medial hand associated with elbow pain but this has diminished since his last visit.  Patient reports a history of bilateral carpal tunnel syndrome treated with night time splinting.  Patient reports that he will start PT/HEP tomorrow.  Patient denies new weakness, numbness or paresthesia.  Denies bowel/bladder dysfunction, fevers, chills, weight loss, night pain, or night sweats.\par \par 7/30/20\par 61 yo M with PMH fibromyalgia, HLD, HTN, GERD who presents with neck and low back pain.\par \par Onset: 2-3 months neck pain/right arm paresthesia.  Low back pain present for several years.  No inciting events, trauma, or falls.\par Location: right side cervical spine, lower lumbar spine\par Characteristics: sharp\par Aggravating factors: elbow pressure, overhead activities (right arm paresthesia), prolonged sitting, standing, walking (low back pain)\par Alleviating factors: rest\par Radiation: right upper extremities, right lower extremities\par Treatments: right shoulder injection with significant improvement for close to one year, no PT/HEP, no recent trials of PO medication\par Severity:  7/10\par \par Diagnostic studies:\par MRI lumbar spine disc bulge L4-L5 leading to central spinal stenosis\par No previous MRI cervical spine\par MRI right shoulder w/o contrast showing parital-thickness articular side tear of supraspinatus.\par No previous EMG/NCS\par \par Patient denies new weakness, numbness or paresthesia.  Patient denies bowel/bladder dysfunction, fevers, chills, weight loss, night pain, or night sweats.\par

## 2020-08-09 NOTE — REVIEW OF SYSTEMS
[Negative] : Heme/Lymph [FreeTextEntry9] : +neck, low back pain [de-identified] : +Medial hand numbness and paresthesia

## 2020-08-20 ENCOUNTER — APPOINTMENT (OUTPATIENT)
Dept: PHYSICAL MEDICINE AND REHAB | Facility: CLINIC | Age: 60
End: 2020-08-20
Payer: COMMERCIAL

## 2020-08-20 VITALS
OXYGEN SATURATION: 100 % | HEART RATE: 56 BPM | TEMPERATURE: 98.3 F | SYSTOLIC BLOOD PRESSURE: 132 MMHG | DIASTOLIC BLOOD PRESSURE: 76 MMHG

## 2020-08-20 DIAGNOSIS — M65.30 TRIGGER FINGER, UNSPECIFIED FINGER: ICD-10-CM

## 2020-08-20 DIAGNOSIS — M54.2 CERVICALGIA: ICD-10-CM

## 2020-08-20 DIAGNOSIS — G56.21 LESION OF ULNAR NERVE, RIGHT UPPER LIMB: ICD-10-CM

## 2020-08-20 DIAGNOSIS — M54.9 DORSALGIA, UNSPECIFIED: ICD-10-CM

## 2020-08-20 DIAGNOSIS — G56.01 CARPAL TUNNEL SYNDROME, RIGHT UPPER LIMB: ICD-10-CM

## 2020-08-20 PROCEDURE — 99214 OFFICE O/P EST MOD 30 MIN: CPT

## 2020-08-20 RX ORDER — MELOXICAM 15 MG/1
15 TABLET ORAL DAILY
Qty: 14 | Refills: 0 | Status: ACTIVE | COMMUNITY
Start: 2020-08-20 | End: 1900-01-01

## 2020-08-24 PROBLEM — G56.01 CARPAL TUNNEL SYNDROME OF RIGHT WRIST: Status: ACTIVE | Noted: 2020-08-06

## 2020-08-24 PROBLEM — M54.2 CERVICAL PAIN: Status: ACTIVE | Noted: 2020-06-16

## 2020-08-24 PROBLEM — G56.21 ULNAR NEUROPATHY AT ELBOW OF RIGHT UPPER EXTREMITY: Status: ACTIVE | Noted: 2020-07-30

## 2020-08-24 PROBLEM — M54.9 BACK PAIN: Status: ACTIVE | Noted: 2018-07-20

## 2020-08-24 NOTE — HISTORY OF PRESENT ILLNESS
[FreeTextEntry1] : 8/20/20\par 59 yo M who presents for follow up with neck, right elbow, and low back pain.  Patient underwent EMG/NCS on previous visit showing evidence of ulnar neuropathy of the right elbow, CTS right wrist, and right C5 and C6 radiculopathies.  Patient continues to have severe right sided neck pain with radiation into right upper extremity refractory to conservative treatment including PT/HEP.  Patient started compression of the right elbow and is planning to buy neutral wrist splint.\par \par On this visit, patient complains about triggering of the left fourth and fifth digits.  Patient reports locking with flexion of the fingers that improves with messaging.  No previous treatments or therapies.  Patient denies new weakness, numbness or paresthesia.  Denies bowel/bladder dysfunction, fevers, chills, weight loss, night pain, or night sweats.\par \par 8/6/20\par 59 yo M who presents for follow up with neck and low back pain.  Patient reports that pain has migrated to the lateral aspect of his right hand and medial forearm.  Patient continues to have some pain in the medial hand associated with elbow pain but this has diminished since his last visit.  Patient reports a history of bilateral carpal tunnel syndrome treated with night time splinting.  Patient reports that he will start PT/HEP tomorrow.  Patient denies new weakness, numbness or paresthesia.  Denies bowel/bladder dysfunction, fevers, chills, weight loss, night pain, or night sweats.\par \par 7/30/20\par 59 yo M with PMH fibromyalgia, HLD, HTN, GERD who presents with neck and low back pain.\par \par Onset: 2-3 months neck pain/right arm paresthesia.  Low back pain present for several years.  No inciting events, trauma, or falls.\par Location: right side cervical spine, lower lumbar spine\par Characteristics: sharp\par Aggravating factors: elbow pressure, overhead activities (right arm paresthesia), prolonged sitting, standing, walking (low back pain)\par Alleviating factors: rest\par Radiation: right upper extremities, right lower extremities\par Treatments: right shoulder injection with significant improvement for close to one year, no PT/HEP, no recent trials of PO medication\par Severity:  7/10\par \par Diagnostic studies:\par MRI lumbar spine disc bulge L4-L5 leading to central spinal stenosis\par No previous MRI cervical spine\par MRI right shoulder w/o contrast showing parital-thickness articular side tear of supraspinatus.\par No previous EMG/NCS\par \par Patient denies new weakness, numbness or paresthesia.  Patient denies bowel/bladder dysfunction, fevers, chills, weight loss, night pain, or night sweats.\par

## 2020-08-24 NOTE — ASSESSMENT
[FreeTextEntry1] : 59 yo M who presents with \par 1) neck pain consistent with cervicalgia, cervical facet syndrome, and cervical radiculopathy\par 2) numbness left hand consistent with ulnar neuropathy at the elbow vs. ulnar neuropathy at the wrist vs. cervical radiculopathy\par 3) low back pain consistent with lumbar radiculopathy, lumbar spinal stenosis, and lumbar degenerative disc disease.\par \par Patient continues to have neck pain with radiation into right upper extremity consistent with cervical radiculopathy.  Pain persistent despite greater than six weeks of conservative care including PT/HEP, PO NSAIDs, and relative rest.  EMG/NCS performed showing evidence of a right C5 and C6 radiculopathies.  \par \par -MRI cervical spine w/o contrast ordered\par -Start mobic 15 mg PO qdaily x 14 days, recommend to take with food.  Denies CKD, CAD, or gastritis.  Recommend that if patient develops GI symptoms including abdominal pain, nausea, or vomiting to discontinue use of medication immediately.\par -Cont PT/HEP, referral provided on last visit and patient scheduled to start tomorrow\par -Cont. neutral wrist splint and elbow compression for CTS and UNE, respectively\par -RTC following MRI to review results.\par \par Nish Barone MD\par Spine and Sports Medicine\par \par [employer]\par At [facility]\par \par

## 2020-08-24 NOTE — ASSESSMENT
[FreeTextEntry1] : 59 yo M who presents with \par 1) neck pain consistent with cervicalgia, cervical facet syndrome, and cervical radiculopathy\par 2) numbness left hand consistent with ulnar neuropathy at the elbow vs. ulnar neuropathy at the wrist vs. cervical radiculopathy\par 3) low back pain consistent with lumbar radiculopathy, lumbar spinal stenosis, and lumbar degenerative disc disease.\par \par Patient continues to have neck pain with radiation into right upper extremity consistent with cervical radiculopathy.  Pain persistent despite greater than six weeks of conservative care including PT/HEP, PO NSAIDs, and relative rest.  EMG/NCS performed showing evidence of a right C5 and C6 radiculopathies.  \par \par -MRI cervical spine w/o contrast ordered\par -Start mobic 15 mg PO qdaily x 14 days, recommend to take with food.  Denies CKD, CAD, or gastritis.  Recommend that if patient develops GI symptoms including abdominal pain, nausea, or vomiting to discontinue use of medication immediately.\par -Cont PT/HEP, referral provided on last visit and patient scheduled to start tomorrow\par -Cont. neutral wrist splint and elbow compression for CTS and UNE, respectively\par -RTC following MRI to review results.\par \par Nish Barone MD\par Spine and Sports Medicine\par \par [employer]\par At [facility]\par \par

## 2020-08-24 NOTE — HISTORY OF PRESENT ILLNESS
[FreeTextEntry1] : 8/20/20\par 61 yo M who presents for follow up with neck, right elbow, and low back pain.  Patient underwent EMG/NCS on previous visit showing evidence of ulnar neuropathy of the right elbow, CTS right wrist, and right C5 and C6 radiculopathies.  Patient continues to have severe right sided neck pain with radiation into right upper extremity refractory to conservative treatment including PT/HEP.  Patient started compression of the right elbow and is planning to buy neutral wrist splint.\par \par On this visit, patient complains about triggering of the left fourth and fifth digits.  Patient reports locking with flexion of the fingers that improves with messaging.  No previous treatments or therapies.  Patient denies new weakness, numbness or paresthesia.  Denies bowel/bladder dysfunction, fevers, chills, weight loss, night pain, or night sweats.\par \par 8/6/20\par 61 yo M who presents for follow up with neck and low back pain.  Patient reports that pain has migrated to the lateral aspect of his right hand and medial forearm.  Patient continues to have some pain in the medial hand associated with elbow pain but this has diminished since his last visit.  Patient reports a history of bilateral carpal tunnel syndrome treated with night time splinting.  Patient reports that he will start PT/HEP tomorrow.  Patient denies new weakness, numbness or paresthesia.  Denies bowel/bladder dysfunction, fevers, chills, weight loss, night pain, or night sweats.\par \par 7/30/20\par 61 yo M with PMH fibromyalgia, HLD, HTN, GERD who presents with neck and low back pain.\par \par Onset: 2-3 months neck pain/right arm paresthesia.  Low back pain present for several years.  No inciting events, trauma, or falls.\par Location: right side cervical spine, lower lumbar spine\par Characteristics: sharp\par Aggravating factors: elbow pressure, overhead activities (right arm paresthesia), prolonged sitting, standing, walking (low back pain)\par Alleviating factors: rest\par Radiation: right upper extremities, right lower extremities\par Treatments: right shoulder injection with significant improvement for close to one year, no PT/HEP, no recent trials of PO medication\par Severity:  7/10\par \par Diagnostic studies:\par MRI lumbar spine disc bulge L4-L5 leading to central spinal stenosis\par No previous MRI cervical spine\par MRI right shoulder w/o contrast showing parital-thickness articular side tear of supraspinatus.\par No previous EMG/NCS\par \par Patient denies new weakness, numbness or paresthesia.  Patient denies bowel/bladder dysfunction, fevers, chills, weight loss, night pain, or night sweats.\par

## 2020-08-24 NOTE — PHYSICAL EXAM
[FreeTextEntry1] : Gen: NAD\par Neck: ROM limited by pain, tenderness to lower cervical paraspinals and bilateral upper trapezius, pos spurling right\par CV: no cyanosis\par Pulm: breathing well on room air\par Abd: soft\par Low back: range of motion limited by pain, tenderness to palpation lower lumbar paraspinals, neg straight leg raise, neg FABERE, neg FAIR\par Right elbow: FAROM, positive tinel's at the elbow, neg mill's, neg cozen's\par Right hand: subtle weakness thumb and finger abduction compared to contralateral side, no appreciable ulnar claw, palmaris brevis sign, wartenberg's sign, neg tinel's, neg phalen's, neg finkelstein, +hyperesthesia median distribution of hand which extends into medial forearm, nodule 4th finger at MCP.\par Msk: \par 5/5 hip flexion B/L, 5/5 knee extension B/L, 5/5 knee flexion B/L, 5/5 dorsiflexion B/L, 5/5 plantar flexion B/L\par 5/5 shoulder abduction B/L, 5/5 elbow flexion B/L, 5/5 elbow extension B/L, 5/5 wrist extension B/L, 5/5 hand  B/L\par Neuro: sensation intact to light touch in bilateral upper and lower extremities, reflexes 2+ brachioradialis, biceps, triceps bilaterally, reflexes 2+ patella, medial hamstring, achilles bilaterally, negative babinski, negative naidu\par

## 2020-08-24 NOTE — REVIEW OF SYSTEMS
[Negative] : Heme/Lymph [FreeTextEntry9] : +neck, low back pain [de-identified] : +Medial hand numbness and paresthesia

## 2020-08-24 NOTE — REVIEW OF SYSTEMS
[Negative] : Heme/Lymph [de-identified] : +Medial hand numbness and paresthesia [FreeTextEntry9] : +neck, low back pain

## 2020-09-03 ENCOUNTER — APPOINTMENT (OUTPATIENT)
Dept: PHYSICAL MEDICINE AND REHAB | Facility: CLINIC | Age: 60
End: 2020-09-03

## 2020-10-09 ENCOUNTER — APPOINTMENT (OUTPATIENT)
Dept: OPHTHALMOLOGY | Facility: CLINIC | Age: 60
End: 2020-10-09

## 2021-04-17 ENCOUNTER — EMERGENCY (EMERGENCY)
Facility: HOSPITAL | Age: 61
LOS: 1 days | Discharge: HOME | End: 2021-04-17
Attending: STUDENT IN AN ORGANIZED HEALTH CARE EDUCATION/TRAINING PROGRAM | Admitting: EMERGENCY MEDICINE
Payer: COMMERCIAL

## 2021-04-17 VITALS
DIASTOLIC BLOOD PRESSURE: 84 MMHG | OXYGEN SATURATION: 100 % | TEMPERATURE: 99 F | SYSTOLIC BLOOD PRESSURE: 169 MMHG | HEART RATE: 80 BPM | RESPIRATION RATE: 18 BRPM

## 2021-04-17 LAB
ALBUMIN SERPL ELPH-MCNC: 4.1 G/DL — SIGNIFICANT CHANGE UP (ref 3.3–5)
ALP SERPL-CCNC: 62 U/L — SIGNIFICANT CHANGE UP (ref 40–120)
ALT FLD-CCNC: 14 U/L — SIGNIFICANT CHANGE UP (ref 4–41)
ANION GAP SERPL CALC-SCNC: 10 MMOL/L — SIGNIFICANT CHANGE UP (ref 7–14)
AST SERPL-CCNC: 16 U/L — SIGNIFICANT CHANGE UP (ref 4–40)
BASOPHILS # BLD AUTO: 0.05 K/UL — SIGNIFICANT CHANGE UP (ref 0–0.2)
BASOPHILS NFR BLD AUTO: 0.6 % — SIGNIFICANT CHANGE UP (ref 0–2)
BILIRUB SERPL-MCNC: <0.2 MG/DL — SIGNIFICANT CHANGE UP (ref 0.2–1.2)
BUN SERPL-MCNC: 14 MG/DL — SIGNIFICANT CHANGE UP (ref 7–23)
CALCIUM SERPL-MCNC: 9.4 MG/DL — SIGNIFICANT CHANGE UP (ref 8.4–10.5)
CHLORIDE SERPL-SCNC: 105 MMOL/L — SIGNIFICANT CHANGE UP (ref 98–107)
CO2 SERPL-SCNC: 27 MMOL/L — SIGNIFICANT CHANGE UP (ref 22–31)
CREAT SERPL-MCNC: 1.51 MG/DL — HIGH (ref 0.5–1.3)
EOSINOPHIL # BLD AUTO: 0.32 K/UL — SIGNIFICANT CHANGE UP (ref 0–0.5)
EOSINOPHIL NFR BLD AUTO: 3.9 % — SIGNIFICANT CHANGE UP (ref 0–6)
GLUCOSE SERPL-MCNC: 133 MG/DL — HIGH (ref 70–99)
HCT VFR BLD CALC: 41 % — SIGNIFICANT CHANGE UP (ref 39–50)
HGB BLD-MCNC: 13.4 G/DL — SIGNIFICANT CHANGE UP (ref 13–17)
IANC: 4.52 K/UL — SIGNIFICANT CHANGE UP (ref 1.5–8.5)
IMM GRANULOCYTES NFR BLD AUTO: 0.4 % — SIGNIFICANT CHANGE UP (ref 0–1.5)
LIDOCAIN IGE QN: 37 U/L — SIGNIFICANT CHANGE UP (ref 7–60)
LYMPHOCYTES # BLD AUTO: 2.66 K/UL — SIGNIFICANT CHANGE UP (ref 1–3.3)
LYMPHOCYTES # BLD AUTO: 32.2 % — SIGNIFICANT CHANGE UP (ref 13–44)
MCHC RBC-ENTMCNC: 27.9 PG — SIGNIFICANT CHANGE UP (ref 27–34)
MCHC RBC-ENTMCNC: 32.7 GM/DL — SIGNIFICANT CHANGE UP (ref 32–36)
MCV RBC AUTO: 85.4 FL — SIGNIFICANT CHANGE UP (ref 80–100)
MONOCYTES # BLD AUTO: 0.69 K/UL — SIGNIFICANT CHANGE UP (ref 0–0.9)
MONOCYTES NFR BLD AUTO: 8.3 % — SIGNIFICANT CHANGE UP (ref 2–14)
NEUTROPHILS # BLD AUTO: 4.52 K/UL — SIGNIFICANT CHANGE UP (ref 1.8–7.4)
NEUTROPHILS NFR BLD AUTO: 54.6 % — SIGNIFICANT CHANGE UP (ref 43–77)
NRBC # BLD: 0 /100 WBCS — SIGNIFICANT CHANGE UP
NRBC # FLD: 0 K/UL — SIGNIFICANT CHANGE UP
NT-PROBNP SERPL-SCNC: 32 PG/ML — SIGNIFICANT CHANGE UP
PLATELET # BLD AUTO: 213 K/UL — SIGNIFICANT CHANGE UP (ref 150–400)
POTASSIUM SERPL-MCNC: 4.3 MMOL/L — SIGNIFICANT CHANGE UP (ref 3.5–5.3)
POTASSIUM SERPL-SCNC: 4.3 MMOL/L — SIGNIFICANT CHANGE UP (ref 3.5–5.3)
PROT SERPL-MCNC: 7.2 G/DL — SIGNIFICANT CHANGE UP (ref 6–8.3)
RBC # BLD: 4.8 M/UL — SIGNIFICANT CHANGE UP (ref 4.2–5.8)
RBC # FLD: 15.4 % — HIGH (ref 10.3–14.5)
SODIUM SERPL-SCNC: 142 MMOL/L — SIGNIFICANT CHANGE UP (ref 135–145)
TROPONIN T, HIGH SENSITIVITY RESULT: 8 NG/L — SIGNIFICANT CHANGE UP
TROPONIN T, HIGH SENSITIVITY RESULT: 8 NG/L — SIGNIFICANT CHANGE UP
WBC # BLD: 8.27 K/UL — SIGNIFICANT CHANGE UP (ref 3.8–10.5)
WBC # FLD AUTO: 8.27 K/UL — SIGNIFICANT CHANGE UP (ref 3.8–10.5)

## 2021-04-17 PROCEDURE — 99218: CPT

## 2021-04-17 PROCEDURE — 71045 X-RAY EXAM CHEST 1 VIEW: CPT | Mod: 26

## 2021-04-17 RX ORDER — FAMOTIDINE 10 MG/ML
20 INJECTION INTRAVENOUS ONCE
Refills: 0 | Status: COMPLETED | OUTPATIENT
Start: 2021-04-17 | End: 2021-04-17

## 2021-04-17 RX ORDER — ASPIRIN/CALCIUM CARB/MAGNESIUM 324 MG
162 TABLET ORAL ONCE
Refills: 0 | Status: COMPLETED | OUTPATIENT
Start: 2021-04-17 | End: 2021-04-17

## 2021-04-17 RX ADMIN — FAMOTIDINE 20 MILLIGRAM(S): 10 INJECTION INTRAVENOUS at 22:05

## 2021-04-17 RX ADMIN — Medication 30 MILLILITER(S): at 22:05

## 2021-04-17 RX ADMIN — Medication 162 MILLIGRAM(S): at 22:05

## 2021-04-17 NOTE — ED CDU PROVIDER INITIAL DAY NOTE - OBJECTIVE STATEMENT
61M hx htn and gerd presents with Chest pain radiating from L chest to the Right, sharp, started couple hours ago after eating a bologna sandwich. Patient denies SOB, f/c, cough, abd pain, N/V/D/C, weakness, HA, dizziness, urinary symptoms, extremity pain or swelling or other complaints. last stress more than 2 yrs ago was normal    CDU SULTANA Chance Note-----  ED Provider HPI as above, reviewed.  Pt. is a 62 yo male, PMH cigarette and marijuana smoker (pt states he is still actively smoking), COPD, HTN (on amlodipine/benazepril), GERD, "borderline" DM (no meds), and hx/o "rapid heart rate" (pt unclear exact diagnosis); pt presented to the ED c/o chest pain as per above.  In the ED, EKG sinus arrythmia 74 bpm, no acute/ischemic findings.  Trop 8 ---> 8, CXR with no acute pathology noted.  WBC 8.27, Hb 13.4, CMP: BUN 14, creatinine 1.51, glucose 133, CMP otherwise unremarkable.  ProBNP 32.  Pt. was dispo'd to CDU for continued care plan:  Tele monitoring, stress test, echo, Tele Doc of Day evaluation, general observation care / monitoring.  On CDU evaluation, pt with no active c/o.  Test results, MDM, and CDU care plan d/w pt who verbalizes agreement with plan.

## 2021-04-17 NOTE — ED CDU PROVIDER INITIAL DAY NOTE - MEDICAL DECISION MAKING DETAILS
Tele monitoring, stress test, echo, Tele Doc of Day evaluation, general observation care / monitoring.

## 2021-04-17 NOTE — ED ADULT NURSE NOTE - OBJECTIVE STATEMENT
patient complaining of pain to left side chest x 2 hours ago, sharp in sensation, no exacerbating or reliving symptoms. pain is non-radiating to shoulder or neck. patient has chronic shortness of breath. patient complaining of pain to left side chest x 2 hours ago after eating a baloney sandwich, sharp in sensation, no exacerbating or reliving symptoms. pain is non-radiating to shoulder or neck. patient has chronic shortness of breath.

## 2021-04-17 NOTE — ED ADULT TRIAGE NOTE - CHIEF COMPLAINT QUOTE
Pt st" I have  sharp chest pains....started 2 hours ago..I have history of arythmias...I also feel weak and tired."

## 2021-04-17 NOTE — ED PROVIDER NOTE - ATTENDING CONTRIBUTION TO CARE
61M h/o HTN, GERD p/w chest pain. Describes as sharp pain radiating across upper chest, starting 2hrs prior to ED arrival. States he had eaten a bologna sandwich shortly before pain began but does not feel like typical GERD symptoms. A/w L hand tingling. Denies SOB, cough, fever/chills, abd pain, N/V/D, HA, dizziness, urinary symptoms, extremity pain or swelling or other complaints. Last stress ~2yrs ago, normal per patient.  Gen: nad  CV: rrr, no murmur; chest pain not reproducible with palpation  Pulm: clear lungs  Abd: soft, nt, nd  Ext: warm, no edema/swelling  MDM: 61M h/o HTN, GERD p/w chest pain with associated L hand tingling, concerning for ACS vs GERD - check EKG, trop, basic labs; ASA and pepcid now; anticipate CDU for likely stress

## 2021-04-17 NOTE — ED CDU PROVIDER INITIAL DAY NOTE - INDICATION FOR OBSERVATION
Tele monitoring, stress test, echo, Tele Doc of Day evaluation, general observation care / monitoring./Diagnostic Uncertainty/Other

## 2021-04-17 NOTE — ED CDU PROVIDER INITIAL DAY NOTE - ATTENDING CONTRIBUTION TO CARE
61M h/o HTN, GERD p/w chest pain. Describes as sharp pain radiating across upper chest, starting 2hrs prior to ED arrival. States he had eaten a bologna sandwich shortly before pain began but does not feel like typical GERD symptoms. A/w L hand tingling. Denies SOB, cough, fever/chills, abd pain, N/V/D, HA, dizziness, urinary symptoms, extremity pain or swelling or other complaints. Last stress ~2yrs ago, normal per patient. W/u with non-ischemic EKG, trop 8-->8. Dispo to CDU for tele monitoring and stress in AM.

## 2021-04-17 NOTE — ED PROVIDER NOTE - OBJECTIVE STATEMENT
61M hx htn and gerd presents with Chest pain radiating from L chest to the Right, sharp, started couple hours ago after eating a bologna sandwich. Patient denies SOB, f/c, cough, abd pain, N/V/D/C, weakness, HA, dizziness, urinary symptoms, extremity pain or swelling or other complaints. last stress more than 2 yrs ago was normal

## 2021-04-17 NOTE — ED CDU PROVIDER INITIAL DAY NOTE - PHYSICAL EXAMINATION
CONSTITUTIONAL:  Well appearing, awake, alert, oriented to person, place, time/situation and in no apparent distress.  Pt. is objectively comfortable appearing and verbalizing in full, clear, effortless sentences.  ENMT: NC/AT.  Airway patent.  Nasal mucosa clear.  Moist mucous membranes.  Neck supple.  EYES:  Clear OU.  CARDIAC:  Normal rate, irregular rhythm (sinus arrhythmia on cardiac monitor).  Heart sounds S1 S2.  No murmurs, gallops, or rubs.  RESPIRATORY:  Breath sounds clear and equal bilaterally.  No wheezes, no rales, no rhonchi.  GASTROINTESTINAL:  Abdomen soft, non-distended, non-tender.  No rebound, no guarding.  MUSCULOSKELETAL:  Range of motion is not limited.  Mild edema noted to distal lower extremities bilaterally; LE appear grossly symmetrical; no pitting edema noted.   SKIN:  Skin color unremarkable.  Skin warm, dry, and intact.    PSYCHIATRIC:  Alert and oriented to person/place/time/situation.  Mood and affect WNL.  No apparent risk to self or others.

## 2021-04-17 NOTE — ED PROVIDER NOTE - CLINICAL SUMMARY MEDICAL DECISION MAKING FREE TEXT BOX
61M hx htn and gerd presents with Chest pain radiating from L chest to the Right, sharp, started couple hours ago after eating a bologna sandwich. Likely gerd or gastritis. Plan: Cardiac Monitor, EKG, Labs/cardiac enzymes, ASA +GI cocktail, CXR and consider cdu for stress if pain continues

## 2021-04-17 NOTE — ED CDU PROVIDER INITIAL DAY NOTE - PMH
Borderline diabetes    Cigarette smoker    COPD (chronic obstructive pulmonary disease)    GERD (gastroesophageal reflux disease)    HTN (hypertension)    Marijuana smoker    Rapid heart rate  (In the past, as per pt (unclear exact diagnosis))

## 2021-04-18 LAB
CHOLEST SERPL-MCNC: 148 MG/DL — SIGNIFICANT CHANGE UP
HDLC SERPL-MCNC: 42 MG/DL — SIGNIFICANT CHANGE UP
LIPID PNL WITH DIRECT LDL SERPL: 83 MG/DL — SIGNIFICANT CHANGE UP
NON HDL CHOLESTEROL: 106 MG/DL — SIGNIFICANT CHANGE UP
SARS-COV-2 RNA SPEC QL NAA+PROBE: SIGNIFICANT CHANGE UP
TRIGL SERPL-MCNC: 117 MG/DL — SIGNIFICANT CHANGE UP

## 2021-04-18 PROCEDURE — 93016 CV STRESS TEST SUPVJ ONLY: CPT | Mod: GC

## 2021-04-18 PROCEDURE — 78452 HT MUSCLE IMAGE SPECT MULT: CPT | Mod: 26

## 2021-04-18 PROCEDURE — 99226: CPT

## 2021-04-18 PROCEDURE — 93306 TTE W/DOPPLER COMPLETE: CPT | Mod: 26

## 2021-04-18 PROCEDURE — 93018 CV STRESS TEST I&R ONLY: CPT | Mod: GC

## 2021-04-18 RX ORDER — ASPIRIN/CALCIUM CARB/MAGNESIUM 324 MG
81 TABLET ORAL DAILY
Refills: 0 | Status: DISCONTINUED | OUTPATIENT
Start: 2021-04-18 | End: 2021-04-21

## 2021-04-18 RX ORDER — PANTOPRAZOLE SODIUM 20 MG/1
1 TABLET, DELAYED RELEASE ORAL
Qty: 0 | Refills: 0 | DISCHARGE

## 2021-04-18 RX ORDER — AMLODIPINE BESYLATE AND BENAZEPRIL HYDROCHLORIDE 10; 20 MG/1; MG/1
1 CAPSULE ORAL
Qty: 0 | Refills: 0 | DISCHARGE

## 2021-04-18 RX ORDER — PANTOPRAZOLE SODIUM 20 MG/1
40 TABLET, DELAYED RELEASE ORAL
Refills: 0 | Status: DISCONTINUED | OUTPATIENT
Start: 2021-04-18 | End: 2021-04-21

## 2021-04-18 RX ORDER — AMLODIPINE BESYLATE 2.5 MG/1
10 TABLET ORAL DAILY
Refills: 0 | Status: DISCONTINUED | OUTPATIENT
Start: 2021-04-18 | End: 2021-04-21

## 2021-04-18 RX ORDER — SODIUM CHLORIDE 9 MG/ML
500 INJECTION INTRAMUSCULAR; INTRAVENOUS; SUBCUTANEOUS ONCE
Refills: 0 | Status: COMPLETED | OUTPATIENT
Start: 2021-04-18 | End: 2021-04-18

## 2021-04-18 RX ORDER — BENAZEPRIL HYDROCHLORIDE 40 MG/1
20 TABLET ORAL DAILY
Refills: 0 | Status: DISCONTINUED | OUTPATIENT
Start: 2021-04-18 | End: 2021-04-21

## 2021-04-18 RX ADMIN — Medication 81 MILLIGRAM(S): at 11:18

## 2021-04-18 RX ADMIN — AMLODIPINE BESYLATE 10 MILLIGRAM(S): 2.5 TABLET ORAL at 06:46

## 2021-04-18 RX ADMIN — BENAZEPRIL HYDROCHLORIDE 20 MILLIGRAM(S): 40 TABLET ORAL at 06:46

## 2021-04-18 RX ADMIN — SODIUM CHLORIDE 500 MILLILITER(S): 9 INJECTION INTRAMUSCULAR; INTRAVENOUS; SUBCUTANEOUS at 23:06

## 2021-04-18 RX ADMIN — PANTOPRAZOLE SODIUM 40 MILLIGRAM(S): 20 TABLET, DELAYED RELEASE ORAL at 07:08

## 2021-04-18 NOTE — CONSULT NOTE ADULT - SUBJECTIVE AND OBJECTIVE BOX
Gucci Moon MD  Interventional Cardiology / Endovascular Specialist  [place] Office : [address] [place] [address] [place] [address]  Tel:   [place] Office : [address] [facility] [place] [address]  Tel: [phone]  Cell : [phone]      HISTORY OF PRESENTING ILLNESS:    61M hx htn and gerd presents with Chest pain radiating from L chest to the Right, sharp, started couple hours ago after eating a bologna sandwich. Patient denies SOB, f/c, cough, abd pain, N/V/D/C, weakness, HA, dizziness, urinary symptoms, extremity pain or swelling or other complaints. last stress more than 2 yrs ago was normal    PAST MEDICAL & SURGICAL HISTORY:  HTN (hypertension)    GERD (gastroesophageal reflux disease)    Borderline diabetes    Rapid heart rate  (In the past, as per pt (unclear exact diagnosis))    Cigarette smoker    COPD (chronic obstructive pulmonary disease)    Marijuana smoker    No significant past surgical history        SOCIAL HISTORY: Substance Use (street drugs): ( x ) never used  (  ) other:    FAMILY HISTORY:  No pertinent family history in first degree relatives        REVIEW OF SYSTEMS:  CONSTITUTIONAL: No fever, weight loss, or fatigue  EYES: No eye pain, visual disturbances, or discharge  ENMT:  No difficulty hearing, tinnitus, vertigo; No sinus or throat pain  BREASTS: No pain, masses, or nipple discharge  GASTROINTESTINAL: No abdominal or epigastric pain. No nausea, vomiting, or hematemesis; No diarrhea or constipation. No melena or hematochezia.  GENITOURINARY: No dysuria, frequency, hematuria, or incontinence  NEUROLOGICAL: No headaches, memory loss, loss of strength, numbness, or tremors  ENDOCRINE: No heat or cold intolerance; No hair loss  MUSCULOSKELETAL: No joint pain or swelling; No muscle, back, or extremity pain  PSYCHIATRIC: No depression, anxiety, mood swings, or difficulty sleeping  HEME/LYMPH: No easy bruising, or bleeding gums  All others negative    MEDICATIONS:  amLODIPine   Tablet 10 milliGRAM(s) Oral daily  aspirin enteric coated 81 milliGRAM(s) Oral daily  benazepril 20 milliGRAM(s) Oral daily          pantoprazole    Tablet 40 milliGRAM(s) Oral before breakfast          FAMILY HISTORY:  No pertinent family history in first degree relatives          Allergies    No Known Allergies    Intolerances    	      PHYSICAL EXAM:  T(C): 36.8 (04-18-21 @ 14:16), Max: 37.1 (04-17-21 @ 20:18)  HR: 63 (04-18-21 @ 14:16) (58 - 80)  BP: 138/79 (04-18-21 @ 14:16) (129/72 - 169/84)  RR: 16 (04-18-21 @ 14:16) (16 - 18)  SpO2: 99% (04-18-21 @ 14:16) (98% - 100%)  Wt(kg): --  I&O's Summary      GENERAL: NAD, well-groomed, well-developed  EYES: EOMI, PERRLA, conjunctiva and sclera clear  ENMT: No tonsillar erythema, exudates, or enlargement; Moist mucous membranes, Good dentition, No lesions  Cardiovascular: Normal S1 S2, No JVD, No murmurs, No edema  Respiratory: Lungs clear to auscultation	  Gastrointestinal:  Soft, Non-tender, + BS	  Extremities: Normal range of motion, No clubbing, cyanosis or edema  LYMPH: No lymphadenopathy noted  NERVOUS SYSTEM:  Alert & Oriented X3, Good concentration; Motor Strength 5/5 B/L upper and lower extremities; DTRs 2+ intact and symmetric      LABS:	 	    CARDIAC MARKERS:                                  13.4   8.27  )-----------( 213      ( 17 Apr 2021 21:26 )             41.0     04-17    142  |  105  |  14  ----------------------------<  133<H>  4.3   |  27  |  1.51<H>    Ca    9.4      17 Apr 2021 21:26    TPro  7.2  /  Alb  4.1  /  TBili  <0.2  /  DBili  x   /  AST  16  /  ALT  14  /  AlkPhos  62  04-17    proBNP: Serum Pro-Brain Natriuretic Peptide: 32 pg/mL (04-17 @ 21:26)    Lipid Profile:   HgA1c:   TSH:     Consultant(s) Notes Reviewed:  [x ] YES  [ ] NO    Care Discussed with Consultants/Other Providers [ x] YES  [ ] NO    Imaging Personally Reviewed independently:  [x] YES  [ ] NO    All labs, radiologic studies, vitals, orders and medications list reviewed. Patient is seen and examined at bedside. Case discussed with medical team.    ASSESSMENT/PLAN:

## 2021-04-18 NOTE — CONSULT NOTE ADULT - ASSESSMENT
EKG SR no ischemic changes     Assessment and Plan    1) CP : Atypical , pending rest images currently CP free , trop negative     2) HTN : c/w amlodipine and benzapril     3) DVT: early ambulation

## 2021-04-19 VITALS
OXYGEN SATURATION: 97 % | DIASTOLIC BLOOD PRESSURE: 81 MMHG | HEART RATE: 57 BPM | RESPIRATION RATE: 15 BRPM | SYSTOLIC BLOOD PRESSURE: 135 MMHG | TEMPERATURE: 98 F

## 2021-04-19 PROCEDURE — 99217: CPT

## 2021-04-19 RX ADMIN — AMLODIPINE BESYLATE 10 MILLIGRAM(S): 2.5 TABLET ORAL at 06:21

## 2021-04-19 RX ADMIN — PANTOPRAZOLE SODIUM 40 MILLIGRAM(S): 20 TABLET, DELAYED RELEASE ORAL at 06:21

## 2021-04-19 RX ADMIN — Medication 81 MILLIGRAM(S): at 12:38

## 2021-04-19 RX ADMIN — BENAZEPRIL HYDROCHLORIDE 20 MILLIGRAM(S): 40 TABLET ORAL at 06:21

## 2021-04-19 NOTE — ED CDU PROVIDER SUBSEQUENT DAY NOTE - HISTORY
61 year old male with PMH of COPD, HTN, GERD presented to the ED complaining of chest pain. Seen this morning during rounds, feels a lot better, denies any chest pain at this time. Pt has remained HD stable on the monitor, no adverse arrhythmias. He is s/p stress and echo, tolerated the procedures well, pending official reports and TDD evaluation. Continue current CDU plan and cardiac monitoring.
Patient is a 61 year old male with PMH of COPD, HTN, GERD who presented to the ED complaining of chest pain. Pt seen and worked up in ED; EKG sinus arrythmia 74 bpm, no acute/ischemic findings.  Trop 8 ---> 8, CXR with no acute pathology noted, other labs stable. Pt transferred to CDU for echo/stress. While in CDU patient echo wnl. Stress test ?artifact vs defect. Pt pending resting images in AM. Pt feeling better besides feeling little tired s/p test today. No acute events overnight. No events on tele besides mild sinus bradycardia at 56 bpm. Will continue with current CDU tx plan and monitor closely.

## 2021-04-19 NOTE — ED CDU PROVIDER SUBSEQUENT DAY NOTE - PMH
Borderline diabetes    Cigarette smoker    COPD (chronic obstructive pulmonary disease)    GERD (gastroesophageal reflux disease)    HTN (hypertension)    Marijuana smoker    Rapid heart rate  (In the past, as per pt (unclear exact diagnosis))  
Borderline diabetes    Cigarette smoker    COPD (chronic obstructive pulmonary disease)    GERD (gastroesophageal reflux disease)    HTN (hypertension)    Marijuana smoker    Rapid heart rate  (In the past, as per pt (unclear exact diagnosis))

## 2021-04-19 NOTE — PROGRESS NOTE ADULT - SUBJECTIVE AND OBJECTIVE BOX
Gucci Moon MD  Interventional Cardiology / Advance Heart Failure and Cardiac Transplant Specialist  [place] Office : [address] [place] [address] [place] [address]  Tel:   [place] Office : [address] [facility] [place] [address]  Tel: [phone]  Cell : [phone]    Subjective/Overnight events: Pt is lying in bed comfortable not in distress,  	  MEDICATIONS:  amLODIPine   Tablet 10 milliGRAM(s) Oral daily  aspirin enteric coated 81 milliGRAM(s) Oral daily  benazepril 20 milliGRAM(s) Oral daily          pantoprazole    Tablet 40 milliGRAM(s) Oral before breakfast          PAST MEDICAL/SURGICAL HISTORY  PAST MEDICAL & SURGICAL HISTORY:  HTN (hypertension)    GERD (gastroesophageal reflux disease)    Borderline diabetes    Rapid heart rate  (In the past, as per pt (unclear exact diagnosis))    Cigarette smoker    COPD (chronic obstructive pulmonary disease)    Marijuana smoker    No significant past surgical history        SOCIAL HISTORY: Substance Use (street drugs): ( x ) never used  (  ) other:    FAMILY HISTORY:  No pertinent family history in first degree relatives        REVIEW OF SYSTEMS:  CONSTITUTIONAL: No fever, weight loss, or fatigue  EYES: No eye pain, visual disturbances, or discharge  ENMT:  No difficulty hearing, tinnitus, vertigo; No sinus or throat pain  BREASTS: No pain, masses, or nipple discharge  GASTROINTESTINAL: No abdominal or epigastric pain. No nausea, vomiting, or hematemesis; No diarrhea or constipation. No melena or hematochezia.  GENITOURINARY: No dysuria, frequency, hematuria, or incontinence  NEUROLOGICAL: No headaches, memory loss, loss of strength, numbness, or tremors  ENDOCRINE: No heat or cold intolerance; No hair loss  MUSCULOSKELETAL: No joint pain or swelling; No muscle, back, or extremity pain  PSYCHIATRIC: No depression, anxiety, mood swings, or difficulty sleeping  HEME/LYMPH: No easy bruising, or bleeding gums  All others negative    PHYSICAL EXAM:  T(C): 36.8 (04-19-21 @ 11:30), Max: 36.9 (04-18-21 @ 21:50)  HR: 61 (04-19-21 @ 11:30) (52 - 62)  BP: 142/83 (04-19-21 @ 11:30) (120/80 - 142/83)  RR: 15 (04-19-21 @ 11:30) (14 - 17)  SpO2: 99% (04-19-21 @ 11:30) (99% - 100%)  Wt(kg): --  I&O's Summary        GENERAL: NAD  EYES: EOMI, PERRLA, conjunctiva and sclera clear  ENMT: No tonsillar erythema, exudates, or enlargement; Moist mucous membranes, Good dentition, No lesions  Cardiovascular: Normal S1 S2, No JVD, No murmurs, No edema  Respiratory: Lungs clear to auscultation	  Gastrointestinal:  Soft, Non-tender, + BS	  Extremities: Normal range of motion, No clubbing, cyanosis or edema  LYMPH: No lymphadenopathy noted  NERVOUS SYSTEM:  Alert & Oriented X3, Good concentration; Motor Strength 5/5 B/L upper and lower extremities; DTRs 2+ intact and symmetric                                    13.4   8.27  )-----------( 213      ( 17 Apr 2021 21:26 )             41.0     04-17    142  |  105  |  14  ----------------------------<  133<H>  4.3   |  27  |  1.51<H>    Ca    9.4      17 Apr 2021 21:26    TPro  7.2  /  Alb  4.1  /  TBili  <0.2  /  DBili  x   /  AST  16  /  ALT  14  /  AlkPhos  62  04-17    proBNP:   Lipid Profile:   HgA1c:   TSH:     Consultant(s) Notes Reviewed:  [x ] YES  [ ] NO    Care Discussed with Consultants/Other Providers [ x] YES  [ ] NO    Imaging Personally Reviewed independently:  [x] YES  [ ] NO    All labs, radiologic studies, vitals, orders and medications list reviewed. Patient is seen and examined at bedside. Case discussed with medical team.

## 2021-04-19 NOTE — ED CDU PROVIDER SUBSEQUENT DAY NOTE - ATTENDING CONTRIBUTION TO CARE
61M HTN GERD, CP rad R-L, sent here for r/o ACS via stress test.  Known mild CKD.  Echo was done, stress yest not complete - needs resting images.  Update - NucST wnl.  OK to f/u cards as outpt.  Pt ambulatory and geetha PO, in no distress.  OK for d/c home f/u PMD.   VS:  unremarkable    GEN - NAD;   well appearing;   A+O x3   HEAD - NC/AT     ENT - PEERL, EOMI, mucous membranes    moist , no discharge      NECK: Neck supple, non-tender without lymphadenopathy, no masses, no JVD  PULM - CTA b/l,  symmetric breath sounds  COR -  normal heart sounds    ABD - , ND, NT, soft,  BACK - no CVA tenderness, nontender spine     EXTREMS - no edema, no deformity, warm and well perfused    SKIN - no rash    or bruising      NEUROLOGIC - alert, face symmetric, speech fluent, sensation nl, motor no focal deficit.
60 yo m past medical history copd, htn, gerd, preDM with atypical left side cp. trop 8->8, cxr neg. pain overall improved. exam as above. plan: tele, echo, stress, reassess.

## 2021-04-19 NOTE — ED CDU PROVIDER DISPOSITION NOTE - ATTENDING CONTRIBUTION TO CARE
61M HTN GERD, CP rad R-L, sen there for r/o stress.  Known mild CKD.  Echo was done, stress yest not complete - needs resting images.  ST wnl.  OK to f/u cards as outpt.  Gucci Moon MD  Interventional Cardiology / Advance Heart Failure and Cardiac Transplant Specialist  [place] Office : [address] [place] [address] [place] [address]  Tel: [phone] 61M HTN GERD, CP rad R-L, sent here for r/o ACS via stress test.  Known mild CKD.  Echo was done, stress yest not complete - needs resting images.  Update - NucST wnl.  OK to f/u cards as outpt.  Pt ambulatory and geetha PO, in no distress.  OK for d/c home f/u PMD.   VS:  unremarkable    GEN - NAD;   well appearing;   A+O x3   HEAD - NC/AT     ENT - PEERL, EOMI, mucous membranes    moist , no discharge      NECK: Neck supple, non-tender without lymphadenopathy, no masses, no JVD  PULM - CTA b/l,  symmetric breath sounds  COR -  normal heart sounds    ABD - , ND, NT, soft,  BACK - no CVA tenderness, nontender spine     EXTREMS - no edema, no deformity, warm and well perfused    SKIN - no rash    or bruising      NEUROLOGIC - alert, face symmetric, speech fluent, sensation nl, motor no focal deficit.

## 2021-04-19 NOTE — ED CDU PROVIDER DISPOSITION NOTE - NSFOLLOWUPINSTRUCTIONS_ED_ALL_ED_FT
FOLLOW UP WITH YOUR  DOCTOR WITHIN 1 WEEK  BRING THE COPIES OF YOUR RESULTS WITH YOU (PROVIDED)  CAN TAKE TYLENOL 650MG ORALLY EVERY 6 HOURS FOR PAIN OR FEVER.  RETURN TO ED FOR NEW OR WORSENING SYMPTOMS.      Gucci Moon MD  Interventional Cardiology / Advance Heart Failure and Cardiac Transplant Specialist  [place] Office : [address] [place] [address] [place] [address]  Tel: [phone]

## 2021-04-19 NOTE — PROGRESS NOTE ADULT - ASSESSMENT
EKG SR no ischemic changes     Assessment and Plan    1) CP : Atypical ,stress normal, currently CP free , trop negative     2) HTN : c/w amlodipine and benzapril     3) DVT: early ambulation

## 2021-04-19 NOTE — ED CDU PROVIDER SUBSEQUENT DAY NOTE - MEDICAL DECISION MAKING DETAILS
Patient is a 61 year old male with PMH of COPD, HTN, GERD who presented to the ED complaining of chest pain. Pt seen and worked up in ED; EKG sinus arrythmia 74 bpm, no acute/ischemic findings.  Trop 8 ---> 8, CXR with no acute pathology noted, other labs stable. Pt transferred to CDU for echo/stress. While in CDU patient echo wnl. Stress test ?artifact vs defect. Pt pending resting images in AM. Pt feeling better besides feeling little tired s/p test today. No acute events overnight. No events on tele besides mild sinus bradycardia at 56 bpm. Will continue with current CDU tx plan and monitor closely.
61 year old male with PMH of COPD, HTN, GERD presented to the ED complaining of chest pain. Seen this morning during rounds, feels a lot better, denies any chest pain at this time. Pt has remained HD stable on the monitor, no adverse arrhythmias. He is s/p stress and echo, tolerated the procedures well, pending official reports and TDD evaluation. Continue current CDU plan and cardiac monitoring.

## 2021-04-19 NOTE — ED CDU PROVIDER SUBSEQUENT DAY NOTE - PHYSICAL EXAMINATION
Vital signs reviewed.   CONSTITUTIONAL: Well-appearing; well-nourished; in no apparent distress. Non-toxic appearing.   HEAD: Normocephalic, atraumatic.  EYES: conjunctiva and sclera WNL.  NECK:Trachea midline   RESP: NAD  EXT/MS: moves all extremities  SKIN: Normal for age and race  NEURO: Awake, alert, oriented x 3, no gross deficits

## 2021-04-19 NOTE — ED CDU PROVIDER DISPOSITION NOTE - PATIENT PORTAL LINK FT
You can access the FollowMyHealth Patient Portal offered by [facility] by registering at the following website: [email or web address]. By joining [employer]’s FollowMyHealth portal, you will also be able to view your health information using other applications (apps) compatible with our system.

## 2021-04-19 NOTE — ED CDU PROVIDER DISPOSITION NOTE - CLINICAL COURSE
61M HTN GERD, CP rad R-L, sent here for r/o ACS via stress test.  Known mild CKD.  Echo was done, stress yest not complete - needs resting images.  Update - NucST wnl.  OK to f/u cards as outpt.  Pt ambulatory and geetha PO, in no distress.  OK for d/c home f/u PMD.

## 2021-04-19 NOTE — ED CDU PROVIDER SUBSEQUENT DAY NOTE - FAMILY HISTORY
No pertinent family history in first degree relatives  
No pertinent family history in first degree relatives

## 2021-04-21 PROBLEM — J44.9 CHRONIC OBSTRUCTIVE PULMONARY DISEASE, UNSPECIFIED: Chronic | Status: ACTIVE | Noted: 2021-04-18

## 2021-04-21 PROBLEM — I10 ESSENTIAL (PRIMARY) HYPERTENSION: Chronic | Status: ACTIVE | Noted: 2021-04-18

## 2021-04-21 PROBLEM — F12.90 CANNABIS USE, UNSPECIFIED, UNCOMPLICATED: Chronic | Status: ACTIVE | Noted: 2021-04-18

## 2021-04-21 PROBLEM — F17.210 NICOTINE DEPENDENCE, CIGARETTES, UNCOMPLICATED: Chronic | Status: ACTIVE | Noted: 2021-04-18

## 2021-04-21 PROBLEM — R73.03 PREDIABETES: Chronic | Status: ACTIVE | Noted: 2021-04-18

## 2021-04-21 PROBLEM — R00.0 TACHYCARDIA, UNSPECIFIED: Chronic | Status: ACTIVE | Noted: 2021-04-18

## 2021-04-21 PROBLEM — K21.9 GASTRO-ESOPHAGEAL REFLUX DISEASE WITHOUT ESOPHAGITIS: Chronic | Status: ACTIVE | Noted: 2021-04-18

## 2021-05-27 ENCOUNTER — APPOINTMENT (OUTPATIENT)
Dept: RHEUMATOLOGY | Facility: CLINIC | Age: 61
End: 2021-05-27
Payer: COMMERCIAL

## 2021-05-27 VITALS
WEIGHT: 243 LBS | OXYGEN SATURATION: 98 % | SYSTOLIC BLOOD PRESSURE: 146 MMHG | HEART RATE: 53 BPM | DIASTOLIC BLOOD PRESSURE: 86 MMHG | HEIGHT: 72 IN | BODY MASS INDEX: 32.91 KG/M2 | TEMPERATURE: 97.7 F

## 2021-05-27 DIAGNOSIS — M25.551 PAIN IN RIGHT HIP: ICD-10-CM

## 2021-05-27 DIAGNOSIS — R10.9 UNSPECIFIED ABDOMINAL PAIN: ICD-10-CM

## 2021-05-27 DIAGNOSIS — M25.552 PAIN IN RIGHT HIP: ICD-10-CM

## 2021-05-27 PROCEDURE — 99213 OFFICE O/P EST LOW 20 MIN: CPT

## 2021-05-27 PROCEDURE — 99072 ADDL SUPL MATRL&STAF TM PHE: CPT

## 2021-06-07 ENCOUNTER — APPOINTMENT (OUTPATIENT)
Dept: ULTRASOUND IMAGING | Facility: IMAGING CENTER | Age: 61
End: 2021-06-07

## 2021-06-08 DIAGNOSIS — M16.9 OSTEOARTHRITIS OF HIP, UNSPECIFIED: ICD-10-CM

## 2021-06-30 ENCOUNTER — APPOINTMENT (OUTPATIENT)
Dept: RHEUMATOLOGY | Facility: CLINIC | Age: 61
End: 2021-06-30
Payer: COMMERCIAL

## 2021-06-30 VITALS
HEART RATE: 87 BPM | BODY MASS INDEX: 31.29 KG/M2 | OXYGEN SATURATION: 98 % | WEIGHT: 231 LBS | TEMPERATURE: 97.3 F | HEIGHT: 72 IN | RESPIRATION RATE: 16 BRPM | DIASTOLIC BLOOD PRESSURE: 81 MMHG | SYSTOLIC BLOOD PRESSURE: 132 MMHG

## 2021-06-30 DIAGNOSIS — M70.62 TROCHANTERIC BURSITIS, RIGHT HIP: ICD-10-CM

## 2021-06-30 DIAGNOSIS — M70.61 TROCHANTERIC BURSITIS, RIGHT HIP: ICD-10-CM

## 2021-06-30 PROCEDURE — 20610 DRAIN/INJ JOINT/BURSA W/O US: CPT | Mod: 50

## 2021-06-30 PROCEDURE — 99072 ADDL SUPL MATRL&STAF TM PHE: CPT

## 2021-06-30 RX ORDER — METHYLPRED ACET/NACL,ISO-OS/PF 40 MG/ML
40 VIAL (ML) INJECTION
Qty: 1 | Refills: 0 | Status: COMPLETED | OUTPATIENT
Start: 2021-06-30

## 2021-06-30 RX ADMIN — METHYLPREDNISOLONE ACETATE MG/ML: 40 INJECTION, SUSPENSION INTRA-ARTICULAR; INTRALESIONAL; INTRAMUSCULAR; SOFT TISSUE at 00:00

## 2021-06-30 NOTE — PROCEDURE
[Today's Date:] : Date: [unfilled] [Arthrocentesis] : arthrocentesis was performed [Patient] : the patient [Risks] : risks [Consent Obtained] : written consent was obtained prior to the procedure and is detailed in the patient's record [Therapeutic] : therapeutic [#1 Site: ______] : #1 site identified in the [unfilled] [Ethyl Chloride] : ethyl chloride [Betadine] : betadine solution [25 gauge 1.5 inch] : A 25 gauge 1.5 inch needle was used [Depomedrol ___ mg] : Depomedrol [unfilled] mg [Tolerated Well] : the patient tolerated the procedure well [No Complications] : there were no complications [Instructions Given] : handouts/patient instructions were given to patient [Patient Instructed to Call] : patient was instructed to call if redness at site, a decrease in range of motion or an increase in pain is noted after procedure. [#2 Site: ___] : # 2 site identified in the [unfilled]

## 2021-11-03 ENCOUNTER — APPOINTMENT (OUTPATIENT)
Dept: NEPHROLOGY | Facility: CLINIC | Age: 61
End: 2021-11-03
Payer: COMMERCIAL

## 2021-11-03 VITALS
BODY MASS INDEX: 31.05 KG/M2 | SYSTOLIC BLOOD PRESSURE: 138 MMHG | HEART RATE: 61 BPM | OXYGEN SATURATION: 99 % | TEMPERATURE: 98.1 F | WEIGHT: 229.28 LBS | DIASTOLIC BLOOD PRESSURE: 84 MMHG | HEIGHT: 72 IN

## 2021-11-03 DIAGNOSIS — I10 ESSENTIAL (PRIMARY) HYPERTENSION: ICD-10-CM

## 2021-11-03 PROCEDURE — 99204 OFFICE O/P NEW MOD 45 MIN: CPT

## 2021-11-05 PROBLEM — I10 HTN (HYPERTENSION): Status: ACTIVE | Noted: 2017-04-06

## 2021-11-05 NOTE — PHYSICAL EXAM
[General Appearance - Alert] : alert [General Appearance - In No Acute Distress] : in no acute distress [General Appearance - Well Nourished] : well nourished [General Appearance - Well Developed] : well developed [General Appearance - Well-Appearing] : healthy appearing [Sclera] : the sclera and conjunctiva were normal [PERRL With Normal Accommodation] : pupils were equal in size, round, and reactive to light [Outer Ear] : the ears and nose were normal in appearance [Hearing Threshold Finger Rub Not [place]] : hearing was normal [Examination Of The Oral Cavity] : the lips and gums were normal [Neck Appearance] : the appearance of the neck was normal [Neck Cervical Mass (___cm)] : no neck mass was observed [Jugular Venous Distention Increased] : there was no jugular-venous distention [Thyroid Diffuse Enlargement] : the thyroid was not enlarged [Exaggerated Use Of Accessory Muscles For Inspiration] : no accessory muscle use [Respiration, Rhythm And Depth] : normal respiratory rhythm and effort [Auscultation Breath Sounds / Voice Sounds] : lungs were clear to auscultation bilaterally [Heart Rate And Rhythm] : heart rate was normal and rhythm regular [Heart Sounds] : normal S1 and S2 [Murmurs] : no murmurs [Heart Sounds Pericardial Friction Rub] : no pericardial rub [Arterial Pulses Carotid] : carotid pulses were normal with no bruits [Edema] : there was no peripheral edema [Bowel Sounds] : normal bowel sounds [Abdomen Soft] : soft [No CVA Tenderness] : no ~M costovertebral angle tenderness [No Spinal Tenderness] : no spinal tenderness [Abnormal Walk] : normal gait [Nail Clubbing] : no clubbing  or cyanosis of the fingernails [Musculoskeletal - Swelling] : no joint swelling seen [Skin Color & Pigmentation] : normal skin color and pigmentation [] : no rash [Cranial Nerves] : cranial nerves 2-12 were intact [Skin Lesions] : no skin lesions [Deep Tendon Reflexes (DTR)] : deep tendon reflexes were 2+ and symmetric [Sensation] : the sensory exam was normal to light touch and pinprick [Motor Exam] : the motor exam was normal [Oriented To Time, Place, And Person] : oriented to person, place, and time [Impaired Insight] : insight and judgment were intact [Mood] : the mood was normal [Affect] : the affect was normal [FreeTextEntry1] : right sided flank superficial tenderness, no rebound tenderness, no masses,

## 2021-11-05 NOTE — HISTORY OF PRESENT ILLNESS
[FreeTextEntry1] : Referred for kidney cysts \par \par 62 yo male with CKD 3, who recently had an evaluation for right sided flank pain and was found to have kidney cysts. referred for the same \par \par looking at his records- creat was 1.47 in 2017 > more recent creat in October 2021 was also 1.5 mg/dl \par has been admitted for hypertensive urgency/ emergency because of non-compliance with medications. \par currently compliant and BP has been good \par no pain meds, no nsaids \par takes pantoprazole for gastric ulcer disease \par \par labs from 9/14 \par urine albumin/creat ratio- 2 mcg/mg of creatinine \par urinalysis with no blood or protein, SG- 1.020\par Creat - 1.6 mg/dl with an eGFR of 53 ml/min \par ultrasound of the kidneys on 6/8- right kidney= 11.9 by 5.6 cm, left kidney-- 10.5 by 6.3 cm\par 3.5 cm simple cyst of the right kidney and a 5.2 cm simple cyst \par \par pmh - htn \par \par social h - denies drugs, smoking \par \par family h - denies any history of kidney disease in the family \par \par \par

## 2021-11-05 NOTE — ASSESSMENT
[FreeTextEntry1] : 60 yo male with CKD 3, who recently had an evaluation for right sided flank pain and was found to have kidney cysts. referred for the same \par \par ckd 3- likely related to episodes of uncontrolled hypertension. stable since 2017 with no proteinuria/ hematuria. \par \par renal cyst- unlikely cause of pain. simple cysts. I will communicate with his PCP ( i left a message to call me back)

## 2021-11-12 ENCOUNTER — APPOINTMENT (OUTPATIENT)
Dept: PHYSICAL MEDICINE AND REHAB | Facility: CLINIC | Age: 61
End: 2021-11-12

## 2021-11-15 ENCOUNTER — APPOINTMENT (OUTPATIENT)
Dept: PHYSICAL MEDICINE AND REHAB | Facility: CLINIC | Age: 61
End: 2021-11-15

## 2021-12-03 ENCOUNTER — TRANSCRIPTION ENCOUNTER (OUTPATIENT)
Age: 61
End: 2021-12-03

## 2022-03-04 ENCOUNTER — APPOINTMENT (OUTPATIENT)
Dept: NEPHROLOGY | Facility: CLINIC | Age: 62
End: 2022-03-04
Payer: COMMERCIAL

## 2022-03-04 VITALS
HEART RATE: 45 BPM | TEMPERATURE: 97.4 F | HEIGHT: 72 IN | WEIGHT: 229.28 LBS | DIASTOLIC BLOOD PRESSURE: 73 MMHG | OXYGEN SATURATION: 98 % | BODY MASS INDEX: 31.05 KG/M2 | SYSTOLIC BLOOD PRESSURE: 138 MMHG

## 2022-03-04 DIAGNOSIS — N28.1 CYST OF KIDNEY, ACQUIRED: ICD-10-CM

## 2022-03-04 DIAGNOSIS — N18.31 CHRONIC KIDNEY DISEASE, STAGE 3A: ICD-10-CM

## 2022-03-04 DIAGNOSIS — N18.30 CHRONIC KIDNEY DISEASE, STAGE 3 UNSPECIFIED: ICD-10-CM

## 2022-03-04 PROCEDURE — 99215 OFFICE O/P EST HI 40 MIN: CPT

## 2022-03-04 NOTE — HISTORY OF PRESENT ILLNESS
[FreeTextEntry1] : follow up ckd 3 \par \par 61 yo male with CKD 3, who recently had an evaluation for right sided flank pain and was found to have kidney cysts. referred for the same \par \par looking at his records- creat was 1.47 in 2017 > more recent creat in October 2021 was also 1.5 mg/dl \par has been admitted for hypertensive urgency/ emergency because of non-compliance with medications. \par currently compliant and BP has been good \par no pain meds, no nsaids \par takes pantoprazole for gastric ulcer disease \par \par labs from 9/14/21\par urine albumin/creat ratio- 2 mcg/mg of creatinine \par urinalysis with no blood or protein, SG- 1.020\par Creat - 1.6 mg/dl with an eGFR of 53 ml/min \par ultrasound of the kidneys on 6/8- right kidney= 11.9 by 5.6 cm, left kidney-- 10.5 by 6.3 cm\par 3.5 cm simple cyst of the right kidney and a 5.2 cm simple cyst \par \par pmh - htn \par \par social h - denies drugs, smoking \par \par family h - denies any history of kidney disease in the family \par \par interim events\par \par none. \par no labs since then \par \par \par

## 2022-03-04 NOTE — PHYSICAL EXAM
[General Appearance - Alert] : alert [General Appearance - In No Acute Distress] : in no acute distress [General Appearance - Well Nourished] : well nourished [General Appearance - Well Developed] : well developed [General Appearance - Well-Appearing] : healthy appearing [Sclera] : the sclera and conjunctiva were normal [PERRL With Normal Accommodation] : pupils were equal in size, round, and reactive to light [Outer Ear] : the ears and nose were normal in appearance [Hearing Threshold Finger Rub Not [place]] : hearing was normal [Examination Of The Oral Cavity] : the lips and gums were normal [Neck Appearance] : the appearance of the neck was normal [Neck Cervical Mass (___cm)] : no neck mass was observed [Jugular Venous Distention Increased] : there was no jugular-venous distention [Thyroid Diffuse Enlargement] : the thyroid was not enlarged [Respiration, Rhythm And Depth] : normal respiratory rhythm and effort [Exaggerated Use Of Accessory Muscles For Inspiration] : no accessory muscle use [Auscultation Breath Sounds / Voice Sounds] : lungs were clear to auscultation bilaterally [Heart Rate And Rhythm] : heart rate was normal and rhythm regular [Heart Sounds] : normal S1 and S2 [Murmurs] : no murmurs [Heart Sounds Pericardial Friction Rub] : no pericardial rub [Arterial Pulses Carotid] : carotid pulses were normal with no bruits [Edema] : there was no peripheral edema [Bowel Sounds] : normal bowel sounds [Abdomen Soft] : soft [FreeTextEntry1] : right sided flank superficial tenderness, no rebound tenderness, no masses,  [No CVA Tenderness] : no ~M costovertebral angle tenderness [No Spinal Tenderness] : no spinal tenderness [Abnormal Walk] : normal gait [Nail Clubbing] : no clubbing  or cyanosis of the fingernails [Musculoskeletal - Swelling] : no joint swelling seen [Skin Color & Pigmentation] : normal skin color and pigmentation [] : no rash [Skin Lesions] : no skin lesions [Cranial Nerves] : cranial nerves 2-12 were intact [Deep Tendon Reflexes (DTR)] : deep tendon reflexes were 2+ and symmetric [Sensation] : the sensory exam was normal to light touch and pinprick [Motor Exam] : the motor exam was normal [Oriented To Time, Place, And Person] : oriented to person, place, and time [Impaired Insight] : insight and judgment were intact [Affect] : the affect was normal [Mood] : the mood was normal

## 2022-04-27 ENCOUNTER — APPOINTMENT (OUTPATIENT)
Dept: RHEUMATOLOGY | Facility: CLINIC | Age: 62
End: 2022-04-27
Payer: COMMERCIAL

## 2022-04-27 VITALS
HEIGHT: 72 IN | TEMPERATURE: 97.2 F | SYSTOLIC BLOOD PRESSURE: 135 MMHG | DIASTOLIC BLOOD PRESSURE: 95 MMHG | OXYGEN SATURATION: 98 % | BODY MASS INDEX: 31.02 KG/M2 | HEART RATE: 58 BPM | WEIGHT: 229 LBS

## 2022-04-27 DIAGNOSIS — M25.512 PAIN IN RIGHT SHOULDER: ICD-10-CM

## 2022-04-27 DIAGNOSIS — M70.61 TROCHANTERIC BURSITIS, RIGHT HIP: ICD-10-CM

## 2022-04-27 DIAGNOSIS — M25.511 PAIN IN RIGHT SHOULDER: ICD-10-CM

## 2022-04-27 PROCEDURE — 20610 DRAIN/INJ JOINT/BURSA W/O US: CPT

## 2022-04-27 PROCEDURE — 99213 OFFICE O/P EST LOW 20 MIN: CPT | Mod: 25

## 2022-04-28 ENCOUNTER — MED ADMIN CHARGE (OUTPATIENT)
Age: 62
End: 2022-04-28

## 2022-04-28 PROBLEM — M70.61 GREATER TROCHANTERIC BURSITIS, RIGHT: Status: ACTIVE | Noted: 2022-04-28

## 2022-04-28 PROBLEM — M25.511 BILATERAL SHOULDER PAIN: Status: ACTIVE | Noted: 2018-01-24

## 2022-04-28 RX ORDER — METHYLPRED ACET/NACL,ISO-OS/PF 80 MG/ML
80 VIAL (ML) INJECTION
Qty: 1 | Refills: 0 | Status: COMPLETED | OUTPATIENT
Start: 2022-04-28

## 2022-04-28 RX ADMIN — METHYLPREDNISOLONE ACETATE MG/ML: 80 INJECTION, SUSPENSION INTRA-ARTICULAR; INTRALESIONAL; INTRAMUSCULAR; SOFT TISSUE at 00:00

## 2022-07-15 ENCOUNTER — APPOINTMENT (OUTPATIENT)
Dept: NEPHROLOGY | Facility: CLINIC | Age: 62
End: 2022-07-15

## 2023-01-18 NOTE — ED CDU PROVIDER NOTE - NS ED ATTENDING STATEMENT MOD
PT CALLED TODAY WANTING AN UPDATE ON HIS SX WITH [facility]   PLEASE ADVISE I have personally performed a face to face diagnostic evaluation on this patient. I have reviewed the PA note and agree with the history, exam, and plan of care, except as noted.

## 2023-06-12 NOTE — ED CDU PROVIDER INITIAL DAY NOTE - NS_OBSORDERDATE_ED_A_ED
17-Apr-2021 23:32 Methotrexate Counseling:  Patient counseled regarding adverse effects of methotrexate including but not limited to nausea, vomiting, abnormalities in liver function tests. Patients may develop mouth sores, rash, diarrhea, and abnormalities in blood counts. The patient understands that monitoring is required including LFT's and blood counts.  There is a rare possibility of scarring of the liver and lung problems that can occur when taking methotrexate. Persistent nausea, loss of appetite, pale stools, dark urine, cough, and shortness of breath should be reported immediately. Patient advised to discontinue methotrexate treatment at least three months before attempting to become pregnant.  I discussed the need for folate supplements while taking methotrexate.  These supplements can decrease side effects during methotrexate treatment. The patient verbalized understanding of the proper use and possible adverse effects of methotrexate.  All of the patient's questions and concerns were addressed.

## 2024-05-16 ENCOUNTER — APPOINTMENT (OUTPATIENT)
Dept: RHEUMATOLOGY | Facility: CLINIC | Age: 64
End: 2024-05-16
Payer: MEDICARE

## 2024-05-16 VITALS
HEIGHT: 72 IN | BODY MASS INDEX: 30.48 KG/M2 | RESPIRATION RATE: 16 BRPM | DIASTOLIC BLOOD PRESSURE: 80 MMHG | WEIGHT: 225 LBS | HEART RATE: 62 BPM | OXYGEN SATURATION: 98 % | SYSTOLIC BLOOD PRESSURE: 125 MMHG

## 2024-05-16 DIAGNOSIS — S29.9XXA UNSPECIFIED INJURY OF THORAX, INITIAL ENCOUNTER: ICD-10-CM

## 2024-05-16 DIAGNOSIS — M54.50 LOW BACK PAIN, UNSPECIFIED: ICD-10-CM

## 2024-05-16 PROCEDURE — G2211 COMPLEX E/M VISIT ADD ON: CPT

## 2024-05-16 PROCEDURE — 99214 OFFICE O/P EST MOD 30 MIN: CPT

## 2024-05-16 RX ORDER — METHOCARBAMOL 500 MG/1
500 TABLET, FILM COATED ORAL
Qty: 30 | Refills: 1 | Status: ACTIVE | COMMUNITY
Start: 2024-05-16 | End: 1900-01-01

## 2024-05-16 NOTE — HISTORY OF PRESENT ILLNESS
[___ Month(s) Ago] : [unfilled] month(s) ago [Currently Experiencing] : currently [Chest Pain] : chest pain [Arthralgias] : arthralgias [FreeTextEntry1] : has not had hip surgery yet - ongoing pain but has pain over the right mid back with muscle spasm for at least 6 months has difficulty sleeping due to pain  [Fatigue] : no fatigue

## 2024-05-16 NOTE — REVIEW OF SYSTEMS
[As noted in HPI] : as noted in HPI [Palpitations] : palpitations [As Noted in HPI] : as noted in HPI [Joint Pain] : joint pain [Negative] : Heme/Lymph [Feeling Tired] : not feeling tired

## 2024-05-16 NOTE — PHYSICAL EXAM
[General Appearance - Alert] : alert [General Appearance - In No Acute Distress] : in no acute distress [Neck Appearance] : the appearance of the neck was normal [Neck Cervical Mass (___cm)] : no neck mass was observed [Jugular Venous Distention Increased] : there was no jugular-venous distention [Thyroid Diffuse Enlargement] : the thyroid was not enlarged [Thyroid Nodule] : there were no palpable thyroid nodules [Auscultation Breath Sounds / Voice Sounds] : lungs were clear to auscultation bilaterally [Heart Sounds] : normal S1 and S2 [Murmurs] : no murmurs [Heart Sounds Pericardial Friction Rub] : no pericardial rub [Bowel Sounds] : normal bowel sounds [Abdomen Soft] : soft [Abdomen Tenderness] : non-tender [Abdomen Mass (___ Cm)] : no abdominal mass palpated [Cervical Lymph Nodes Enlarged Posterior Bilaterally] : posterior cervical [Cervical Lymph Nodes Enlarged Anterior Bilaterally] : anterior cervical [Supraclavicular Lymph Nodes Enlarged Bilaterally] : supraclavicular [No CVA Tenderness] : no ~M costovertebral angle tenderness [Skin Color & Pigmentation] : normal skin color and pigmentation [Skin Turgor] : normal skin turgor [] : no rash [Oriented To Time, Place, And Person] : oriented to person, place, and time [Impaired Insight] : insight and judgment were intact [Affect] : the affect was normal [Abnormal Walk] : normal gait [Nail Clubbing] : no clubbing  or cyanosis of the fingernails [Musculoskeletal - Swelling] : no joint swelling seen [Motor Tone] : muscle strength and tone were normal [FreeTextEntry1] : right shoulder with decreased ROM but minimal pain - left hip with severely decreased ROM - all other joints with full ROM - no synovitis

## 2024-05-16 NOTE — ASSESSMENT
[FreeTextEntry1] :  1. Bilateral hip OA -MRI with severe OA - defer injection at this time- referral to PT - send for new x-rays 2. Cardiac arrhythmias -stable 3. Right/left shoulder rotator cuff tear - MRI with supra and infraspinatus tear - partial -pain is worsening now - not enough time since the last injection -  deferring surgery 4. Right arm paresthesias - will send for x-rays and possible MRI given his symptoms are lasting over 60 days - will start muscle relaxers to try to improve his symptoms 5. right trochanteric bursitis - injection today 6. thoracic pain - send for x-rays rib and thoracic - start muscle relaxers - consider MRi given that pain has been going on for 1 year 7. low back pain - send for new x-rays  I reviewed previous labs results with patients. Diagnosis and Prognosis discussed Continue with current medications medications refilled education provided on cervical stenosis F/u 2 months

## 2024-05-18 ENCOUNTER — APPOINTMENT (OUTPATIENT)
Dept: RADIOLOGY | Facility: CLINIC | Age: 64
End: 2024-05-18
Payer: MEDICARE

## 2024-05-18 ENCOUNTER — OUTPATIENT (OUTPATIENT)
Dept: OUTPATIENT SERVICES | Facility: HOSPITAL | Age: 64
LOS: 1 days | End: 2024-05-18

## 2024-05-18 PROCEDURE — 71100 X-RAY EXAM RIBS UNI 2 VIEWS: CPT | Mod: 26,RT

## 2024-05-18 PROCEDURE — 72070 X-RAY EXAM THORAC SPINE 2VWS: CPT | Mod: 26

## 2024-05-18 PROCEDURE — 73522 X-RAY EXAM HIPS BI 3-4 VIEWS: CPT | Mod: 26

## 2024-05-18 PROCEDURE — 72100 X-RAY EXAM L-S SPINE 2/3 VWS: CPT | Mod: 26

## 2025-03-27 ENCOUNTER — APPOINTMENT (OUTPATIENT)
Dept: RHEUMATOLOGY | Facility: CLINIC | Age: 65
End: 2025-03-27
Payer: MEDICARE

## 2025-03-27 VITALS
BODY MASS INDEX: 30.88 KG/M2 | WEIGHT: 228 LBS | SYSTOLIC BLOOD PRESSURE: 150 MMHG | HEART RATE: 55 BPM | OXYGEN SATURATION: 99 % | DIASTOLIC BLOOD PRESSURE: 81 MMHG | HEIGHT: 72 IN

## 2025-03-27 DIAGNOSIS — M13.0 POLYARTHRITIS, UNSPECIFIED: ICD-10-CM

## 2025-03-27 DIAGNOSIS — M25.511 PAIN IN RIGHT SHOULDER: ICD-10-CM

## 2025-03-27 DIAGNOSIS — M70.62 TROCHANTERIC BURSITIS, RIGHT HIP: ICD-10-CM

## 2025-03-27 DIAGNOSIS — M70.61 TROCHANTERIC BURSITIS, RIGHT HIP: ICD-10-CM

## 2025-03-27 PROCEDURE — 99214 OFFICE O/P EST MOD 30 MIN: CPT | Mod: 25

## 2025-03-27 PROCEDURE — 20610 DRAIN/INJ JOINT/BURSA W/O US: CPT | Mod: 50

## 2025-03-27 RX ADMIN — METHYLPREDNISOLONE ACETATE MG/ML: 40 INJECTION, SUSPENSION INTRA-ARTICULAR; INTRALESIONAL; INTRAMUSCULAR; SOFT TISSUE at 00:00

## 2025-03-28 LAB
ALBUMIN SERPL ELPH-MCNC: 4.4 G/DL
ALP BLD-CCNC: 70 U/L
ALT SERPL-CCNC: 19 U/L
ANION GAP SERPL CALC-SCNC: 13 MMOL/L
AST SERPL-CCNC: 20 U/L
BASOPHILS # BLD AUTO: 0.05 K/UL
BASOPHILS NFR BLD AUTO: 0.8 %
BILIRUB SERPL-MCNC: 0.3 MG/DL
BUN SERPL-MCNC: 13 MG/DL
CALCIUM SERPL-MCNC: 10 MG/DL
CCP AB SER IA-ACNC: <8 U/ML
CHLORIDE SERPL-SCNC: 105 MMOL/L
CO2 SERPL-SCNC: 26 MMOL/L
CREAT SERPL-MCNC: 1.49 MG/DL
CRP SERPL-MCNC: 5 MG/L
EGFRCR SERPLBLD CKD-EPI 2021: 51 ML/MIN/1.73M2
EOSINOPHIL # BLD AUTO: 0.14 K/UL
EOSINOPHIL NFR BLD AUTO: 2.3 %
ERYTHROCYTE [SEDIMENTATION RATE] IN BLOOD BY WESTERGREN METHOD: 21 MM/HR
GLUCOSE SERPL-MCNC: 87 MG/DL
HCT VFR BLD CALC: 44.5 %
HGB BLD-MCNC: 14.5 G/DL
IMM GRANULOCYTES NFR BLD AUTO: 0.2 %
LYMPHOCYTES # BLD AUTO: 2.49 K/UL
LYMPHOCYTES NFR BLD AUTO: 40.7 %
MAN DIFF?: NORMAL
MCHC RBC-ENTMCNC: 27.8 PG
MCHC RBC-ENTMCNC: 32.6 G/DL
MCV RBC AUTO: 85.4 FL
MONOCYTES # BLD AUTO: 0.43 K/UL
MONOCYTES NFR BLD AUTO: 7 %
NEUTROPHILS # BLD AUTO: 3 K/UL
NEUTROPHILS NFR BLD AUTO: 49 %
PLATELET # BLD AUTO: 217 K/UL
POTASSIUM SERPL-SCNC: 4.2 MMOL/L
PROT SERPL-MCNC: 7.5 G/DL
RBC # BLD: 5.21 M/UL
RBC # FLD: 16.2 %
RF+CCP IGG SER-IMP: NEGATIVE
RHEUMATOID FACT SER QL: <10 IU/ML
SODIUM SERPL-SCNC: 144 MMOL/L
WBC # FLD AUTO: 6.12 K/UL

## 2025-03-30 RX ORDER — METHYLPRED ACET/NACL,ISO-OS/PF 40 MG/ML
40 VIAL (ML) INJECTION
Refills: 0 | Status: COMPLETED | OUTPATIENT
Start: 2025-03-27

## 2025-04-15 ENCOUNTER — APPOINTMENT (OUTPATIENT)
Dept: ULTRASOUND IMAGING | Facility: CLINIC | Age: 65
End: 2025-04-15
Payer: MEDICARE

## 2025-04-15 ENCOUNTER — APPOINTMENT (OUTPATIENT)
Dept: RADIOLOGY | Facility: CLINIC | Age: 65
End: 2025-04-15
Payer: MEDICARE

## 2025-04-15 ENCOUNTER — OUTPATIENT (OUTPATIENT)
Dept: OUTPATIENT SERVICES | Facility: HOSPITAL | Age: 65
LOS: 1 days | End: 2025-04-15
Payer: MEDICARE

## 2025-04-15 DIAGNOSIS — M25.551 PAIN IN RIGHT HIP: ICD-10-CM

## 2025-04-15 PROCEDURE — 73522 X-RAY EXAM HIPS BI 3-4 VIEWS: CPT | Mod: 26

## 2025-04-15 PROCEDURE — 73522 X-RAY EXAM HIPS BI 3-4 VIEWS: CPT

## 2025-04-15 PROCEDURE — 76881 US COMPL JOINT R-T W/IMG: CPT | Mod: 26,RT

## 2025-04-15 PROCEDURE — 76881 US COMPL JOINT R-T W/IMG: CPT

## 2025-04-23 DIAGNOSIS — M75.101 UNSPECIFIED ROTATOR CUFF TEAR OR RUPTURE OF RIGHT SHOULDER, NOT SPECIFIED AS TRAUMATIC: ICD-10-CM

## 2025-06-26 ENCOUNTER — APPOINTMENT (OUTPATIENT)
Dept: RHEUMATOLOGY | Facility: CLINIC | Age: 65
End: 2025-06-26
Payer: MEDICARE

## 2025-06-26 VITALS
HEIGHT: 72 IN | RESPIRATION RATE: 16 BRPM | DIASTOLIC BLOOD PRESSURE: 71 MMHG | WEIGHT: 228 LBS | SYSTOLIC BLOOD PRESSURE: 116 MMHG | HEART RATE: 62 BPM | BODY MASS INDEX: 30.88 KG/M2 | TEMPERATURE: 98.1 F | OXYGEN SATURATION: 98 %

## 2025-06-26 PROCEDURE — 20610 DRAIN/INJ JOINT/BURSA W/O US: CPT | Mod: RT

## 2025-06-26 PROCEDURE — 99214 OFFICE O/P EST MOD 30 MIN: CPT | Mod: 25

## 2025-06-26 RX ORDER — METHYLPRED ACET/NACL,ISO-OS/PF 40 MG/ML
40 VIAL (ML) INJECTION
Refills: 0 | Status: COMPLETED | OUTPATIENT
Start: 2025-06-26

## 2025-06-26 RX ADMIN — METHYLPREDNISOLONE ACETATE 40 MG/ML: 40 INJECTION, SUSPENSION INTRA-ARTICULAR; INTRALESIONAL; INTRAMUSCULAR; SOFT TISSUE at 00:00
